# Patient Record
Sex: FEMALE | Race: ASIAN | NOT HISPANIC OR LATINO
[De-identification: names, ages, dates, MRNs, and addresses within clinical notes are randomized per-mention and may not be internally consistent; named-entity substitution may affect disease eponyms.]

---

## 2017-01-13 ENCOUNTER — APPOINTMENT (OUTPATIENT)
Dept: INTERNAL MEDICINE | Facility: CLINIC | Age: 57
End: 2017-01-13

## 2017-01-13 VITALS
HEIGHT: 61.5 IN | DIASTOLIC BLOOD PRESSURE: 72 MMHG | BODY MASS INDEX: 34.67 KG/M2 | HEART RATE: 79 BPM | OXYGEN SATURATION: 98 % | WEIGHT: 186 LBS | RESPIRATION RATE: 14 BRPM | SYSTOLIC BLOOD PRESSURE: 138 MMHG | TEMPERATURE: 98.2 F

## 2017-01-13 DIAGNOSIS — F41.9 ANXIETY DISORDER, UNSPECIFIED: ICD-10-CM

## 2017-01-13 DIAGNOSIS — Z86.39 PERSONAL HISTORY OF OTHER ENDOCRINE, NUTRITIONAL AND METABOLIC DISEASE: ICD-10-CM

## 2017-01-13 DIAGNOSIS — Z82.62 FAMILY HISTORY OF OSTEOPOROSIS: ICD-10-CM

## 2017-01-13 DIAGNOSIS — Z83.49 FAMILY HISTORY OF OTHER ENDOCRINE, NUTRITIONAL AND METABOLIC DISEASES: ICD-10-CM

## 2017-01-20 LAB
25(OH)D3 SERPL-MCNC: 39.8 NG/ML
ALBUMIN MFR SERPL ELPH: 62 %
ALBUMIN SERPL ELPH-MCNC: 4.3 G/DL
ALBUMIN SERPL-MCNC: 4 G/DL
ALBUMIN/GLOB SERPL: 1.6 RATIO
ALP BLD-CCNC: 88 U/L
ALPHA1 GLOB MFR SERPL ELPH: 4.1 %
ALPHA1 GLOB SERPL ELPH-MCNC: 0.3 G/DL
ALPHA2 GLOB MFR SERPL ELPH: 10.7 %
ALPHA2 GLOB SERPL ELPH-MCNC: 0.7 G/DL
ALT SERPL-CCNC: 36 U/L
ANION GAP SERPL CALC-SCNC: 15 MMOL/L
APPEARANCE: CLEAR
AST SERPL-CCNC: 18 U/L
B-GLOBULIN MFR SERPL ELPH: 10.4 %
B-GLOBULIN SERPL ELPH-MCNC: 0.7 G/DL
BACTERIA: NEGATIVE
BASOPHILS # BLD AUTO: 0.06 K/UL
BASOPHILS NFR BLD AUTO: 1.4 %
BILIRUB SERPL-MCNC: 1 MG/DL
BILIRUBIN URINE: NEGATIVE
BLOOD URINE: NEGATIVE
BUN SERPL-MCNC: 22 MG/DL
CALCIUM SERPL-MCNC: 9.7 MG/DL
CALCIUM SERPL-MCNC: 9.7 MG/DL
CHLORIDE SERPL-SCNC: 105 MMOL/L
CHOLEST SERPL-MCNC: 180 MG/DL
CHOLEST/HDLC SERPL: 3.5 RATIO
CO2 SERPL-SCNC: 24 MMOL/L
COLOR: YELLOW
CREAT SERPL-MCNC: 0.72 MG/DL
CRP SERPL-MCNC: <0.2 MG/DL
DEPRECATED KAPPA LC FREE/LAMBDA SER: 0.91 RATIO
EOSINOPHIL # BLD AUTO: 0.06 K/UL
EOSINOPHIL NFR BLD AUTO: 1.4 %
ERYTHROCYTE [SEDIMENTATION RATE] IN BLOOD BY WESTERGREN METHOD: 4 MM/HR
FOLATE SERPL-MCNC: 10 NG/ML
GAMMA GLOB FLD ELPH-MCNC: 0.8 G/DL
GAMMA GLOB MFR SERPL ELPH: 12.8 %
GLUCOSE QUALITATIVE U: NORMAL MG/DL
GLUCOSE SERPL-MCNC: 104 MG/DL
HCT VFR BLD CALC: 43.3 %
HDLC SERPL-MCNC: 51 MG/DL
HGB BLD-MCNC: 14.2 G/DL
IGA SER QL IEP: 101 MG/DL
IGG SER QL IEP: 834 MG/DL
IGM SER QL IEP: 84 MG/DL
IMM GRANULOCYTES NFR BLD AUTO: 0.2 %
INTERPRETATION SERPL IEP-IMP: NORMAL
KAPPA LC CSF-MCNC: 1.76 MG/DL
KAPPA LC SERPL-MCNC: 1.61 MG/DL
KETONES URINE: NEGATIVE
LDLC SERPL CALC-MCNC: 104 MG/DL
LEUKOCYTE ESTERASE URINE: NEGATIVE
LYMPHOCYTES # BLD AUTO: 1.86 K/UL
LYMPHOCYTES NFR BLD AUTO: 41.9 %
MAN DIFF?: NORMAL
MCHC RBC-ENTMCNC: 29.1 PG
MCHC RBC-ENTMCNC: 32.8 GM/DL
MCV RBC AUTO: 88.7 FL
MICROSCOPIC-UA: NORMAL
MONOCYTES # BLD AUTO: 0.3 K/UL
MONOCYTES NFR BLD AUTO: 6.8 %
NEUTROPHILS # BLD AUTO: 2.15 K/UL
NEUTROPHILS NFR BLD AUTO: 48.3 %
NITRITE URINE: NEGATIVE
PARATHYROID HORMONE INTACT: 44 PG/ML
PH URINE: 7
PHOSPHATE SERPL-MCNC: 3.3 MG/DL
PLATELET # BLD AUTO: 211 K/UL
POTASSIUM SERPL-SCNC: 4.1 MMOL/L
PROT SERPL-MCNC: 6.5 G/DL
PROTEIN URINE: NEGATIVE MG/DL
RBC # BLD: 4.88 M/UL
RBC # FLD: 12.6 %
RED BLOOD CELLS URINE: 0 /HPF
SODIUM SERPL-SCNC: 144 MMOL/L
SPECIFIC GRAVITY URINE: 1.02
SQUAMOUS EPITHELIAL CELLS: 2 /HPF
TRIGL SERPL-MCNC: 126 MG/DL
TSH SERPL-ACNC: 3.17 UIU/ML
UROBILINOGEN URINE: NORMAL MG/DL
VIT B12 SERPL-MCNC: 1219 PG/ML
WBC # FLD AUTO: 4.44 K/UL
WHITE BLOOD CELLS URINE: 2 /HPF

## 2017-01-23 LAB
COLLAGEN NTX UR-SCNC: 41 NM BCE/MM CR
CREAT UR-MCNC: 94.4 MG/DL
HBA1C MFR BLD HPLC: 5.7 %

## 2017-01-31 LAB — HEMOCCULT STL QL IA: NEGATIVE

## 2017-02-13 ENCOUNTER — MEDICATION RENEWAL (OUTPATIENT)
Age: 57
End: 2017-02-13

## 2017-02-16 ENCOUNTER — MEDICATION RENEWAL (OUTPATIENT)
Age: 57
End: 2017-02-16

## 2017-02-17 ENCOUNTER — MEDICATION RENEWAL (OUTPATIENT)
Age: 57
End: 2017-02-17

## 2017-04-18 ENCOUNTER — APPOINTMENT (OUTPATIENT)
Dept: INTERNAL MEDICINE | Facility: CLINIC | Age: 57
End: 2017-04-18

## 2017-07-05 ENCOUNTER — RESULT REVIEW (OUTPATIENT)
Age: 57
End: 2017-07-05

## 2017-07-07 ENCOUNTER — APPOINTMENT (OUTPATIENT)
Dept: MAMMOGRAPHY | Facility: CLINIC | Age: 57
End: 2017-07-07

## 2017-07-07 ENCOUNTER — OUTPATIENT (OUTPATIENT)
Dept: OUTPATIENT SERVICES | Facility: HOSPITAL | Age: 57
LOS: 1 days | End: 2017-07-07
Payer: COMMERCIAL

## 2017-07-07 ENCOUNTER — APPOINTMENT (OUTPATIENT)
Dept: ULTRASOUND IMAGING | Facility: CLINIC | Age: 57
End: 2017-07-07

## 2017-07-07 DIAGNOSIS — Z90.49 ACQUIRED ABSENCE OF OTHER SPECIFIED PARTS OF DIGESTIVE TRACT: Chronic | ICD-10-CM

## 2017-07-07 DIAGNOSIS — R92.8 OTHER ABNORMAL AND INCONCLUSIVE FINDINGS ON DIAGNOSTIC IMAGING OF BREAST: ICD-10-CM

## 2017-07-07 PROCEDURE — G0279: CPT

## 2017-07-07 PROCEDURE — 77065 DX MAMMO INCL CAD UNI: CPT

## 2017-07-17 ENCOUNTER — APPOINTMENT (OUTPATIENT)
Dept: CARDIOLOGY | Facility: CLINIC | Age: 57
End: 2017-07-17

## 2017-10-03 ENCOUNTER — APPOINTMENT (OUTPATIENT)
Dept: MRI IMAGING | Facility: CLINIC | Age: 57
End: 2017-10-03

## 2017-10-09 ENCOUNTER — OUTPATIENT (OUTPATIENT)
Dept: OUTPATIENT SERVICES | Facility: HOSPITAL | Age: 57
LOS: 1 days | End: 2017-10-09
Payer: COMMERCIAL

## 2017-10-09 ENCOUNTER — APPOINTMENT (OUTPATIENT)
Dept: MRI IMAGING | Facility: CLINIC | Age: 57
End: 2017-10-09
Payer: COMMERCIAL

## 2017-10-09 ENCOUNTER — APPOINTMENT (OUTPATIENT)
Dept: RADIOLOGY | Facility: CLINIC | Age: 57
End: 2017-10-09
Payer: COMMERCIAL

## 2017-10-09 DIAGNOSIS — Z00.8 ENCOUNTER FOR OTHER GENERAL EXAMINATION: ICD-10-CM

## 2017-10-09 DIAGNOSIS — Z90.49 ACQUIRED ABSENCE OF OTHER SPECIFIED PARTS OF DIGESTIVE TRACT: Chronic | ICD-10-CM

## 2017-10-09 PROCEDURE — 73560 X-RAY EXAM OF KNEE 1 OR 2: CPT

## 2017-10-09 PROCEDURE — 72148 MRI LUMBAR SPINE W/O DYE: CPT | Mod: 26

## 2017-10-09 PROCEDURE — 72148 MRI LUMBAR SPINE W/O DYE: CPT

## 2017-10-09 PROCEDURE — 73560 X-RAY EXAM OF KNEE 1 OR 2: CPT | Mod: 26,50

## 2017-10-16 DIAGNOSIS — M25.562 PAIN IN LEFT KNEE: ICD-10-CM

## 2017-10-16 DIAGNOSIS — M25.561 PAIN IN RIGHT KNEE: ICD-10-CM

## 2017-10-16 DIAGNOSIS — M48.061 SPINAL STENOSIS, LUMBAR REGION WITHOUT NEUROGENIC CLAUDICATION: ICD-10-CM

## 2017-10-16 DIAGNOSIS — M43.16 SPONDYLOLISTHESIS, LUMBAR REGION: ICD-10-CM

## 2017-10-16 DIAGNOSIS — M51.26 OTHER INTERVERTEBRAL DISC DISPLACEMENT, LUMBAR REGION: ICD-10-CM

## 2017-11-01 ENCOUNTER — APPOINTMENT (OUTPATIENT)
Dept: ORTHOPEDIC SURGERY | Facility: CLINIC | Age: 57
End: 2017-11-01

## 2017-11-27 ENCOUNTER — OUTPATIENT (OUTPATIENT)
Dept: OUTPATIENT SERVICES | Facility: HOSPITAL | Age: 57
LOS: 1 days | End: 2017-11-27
Payer: COMMERCIAL

## 2017-11-27 ENCOUNTER — APPOINTMENT (OUTPATIENT)
Dept: RADIOLOGY | Facility: CLINIC | Age: 57
End: 2017-11-27
Payer: COMMERCIAL

## 2017-11-27 DIAGNOSIS — Z13.820 ENCOUNTER FOR SCREENING FOR OSTEOPOROSIS: ICD-10-CM

## 2017-11-27 DIAGNOSIS — Z90.49 ACQUIRED ABSENCE OF OTHER SPECIFIED PARTS OF DIGESTIVE TRACT: Chronic | ICD-10-CM

## 2017-11-27 PROCEDURE — 77080 DXA BONE DENSITY AXIAL: CPT

## 2017-11-27 PROCEDURE — 77080 DXA BONE DENSITY AXIAL: CPT | Mod: 26

## 2018-01-10 ENCOUNTER — APPOINTMENT (OUTPATIENT)
Dept: ORTHOPEDIC SURGERY | Facility: CLINIC | Age: 58
End: 2018-01-10
Payer: COMMERCIAL

## 2018-01-10 VITALS
DIASTOLIC BLOOD PRESSURE: 79 MMHG | WEIGHT: 185 LBS | HEIGHT: 61.5 IN | SYSTOLIC BLOOD PRESSURE: 127 MMHG | HEART RATE: 70 BPM | BODY MASS INDEX: 34.48 KG/M2

## 2018-01-10 PROCEDURE — 72100 X-RAY EXAM L-S SPINE 2/3 VWS: CPT

## 2018-01-10 PROCEDURE — 99214 OFFICE O/P EST MOD 30 MIN: CPT

## 2018-02-27 ENCOUNTER — APPOINTMENT (OUTPATIENT)
Dept: INTERNAL MEDICINE | Facility: CLINIC | Age: 58
End: 2018-02-27

## 2018-09-18 ENCOUNTER — MESSAGE (OUTPATIENT)
Age: 58
End: 2018-09-18

## 2019-03-04 ENCOUNTER — APPOINTMENT (OUTPATIENT)
Dept: ORTHOPEDIC SURGERY | Facility: CLINIC | Age: 59
End: 2019-03-04
Payer: COMMERCIAL

## 2019-03-04 PROCEDURE — 99203 OFFICE O/P NEW LOW 30 MIN: CPT

## 2019-04-30 ENCOUNTER — MESSAGE (OUTPATIENT)
Age: 59
End: 2019-04-30

## 2019-05-16 ENCOUNTER — MESSAGE (OUTPATIENT)
Age: 59
End: 2019-05-16

## 2019-05-30 ENCOUNTER — MESSAGE (OUTPATIENT)
Age: 59
End: 2019-05-30

## 2019-10-16 ENCOUNTER — APPOINTMENT (OUTPATIENT)
Dept: ORTHOPEDIC SURGERY | Facility: CLINIC | Age: 59
End: 2019-10-16
Payer: COMMERCIAL

## 2019-10-16 VITALS
BODY MASS INDEX: 32.62 KG/M2 | SYSTOLIC BLOOD PRESSURE: 133 MMHG | HEART RATE: 79 BPM | HEIGHT: 61.5 IN | DIASTOLIC BLOOD PRESSURE: 82 MMHG | WEIGHT: 175 LBS

## 2019-10-16 DIAGNOSIS — M48.07 SPINAL STENOSIS, LUMBOSACRAL REGION: ICD-10-CM

## 2019-10-16 DIAGNOSIS — M43.16 SPONDYLOLISTHESIS, LUMBAR REGION: ICD-10-CM

## 2019-10-16 DIAGNOSIS — M54.16 RADICULOPATHY, LUMBAR REGION: ICD-10-CM

## 2019-10-16 PROCEDURE — 99214 OFFICE O/P EST MOD 30 MIN: CPT

## 2019-10-16 NOTE — HISTORY OF PRESENT ILLNESS
[All Other ROS Normal] : All other review of systems are negative except as noted [All Hx] : past medical, family, and social [All] : medication and allergy [FreeTextEntry1] : Chronic LBP\par L1 kyphoplasty [FreeTextEntry2] : Ms. LINNETTE PROCTOR  is a 57 year old female who presents to the office for a follow-up visit with a chronic history of back pain.  She also has pain down both legs in the lateral thigh and calf.  She is taking percocet and celebrex for pain control.  She wants to discuss surgery. \par

## 2019-10-16 NOTE — DISCUSSION/SUMMARY
[de-identified] : We discussed further treatment options. We again discussed the surgery in the form of a laminectomy and fusion. She will consider this option. In the interim we will obtain updated lumbar spine MRI

## 2019-10-16 NOTE — PHYSICAL EXAM
[Antalgic] : antalgic [Poor Appearance] : well-appearing [Acute Distress] : not in acute distress [Obese] : not obese [Abl Mood] : in a normal mood [Abl Affect] : with normal affect [Poor Coordination] : normal coordination [Disorientation] : oriented x 3 [FreeTextEntry2] : Examination of the lumbar spine reveals no midline tenderness palpation, step-offs, or skin lesions. Decreased range of motion with respect to flexion, extension, lateral bending, and rotation. No tenderness to palpation of the sciatic notch. No tenderness palpation of the bilateral greater trochanters. No pain with passive internal/external rotation of the hips. No instability of bilateral lower extremities.  Negative GINI. Negative straight leg raise bilaterally. No bowstring. Negative femoral stretch. 5 out of 5 iliopsoas, hip abductors, hips adductors, quadriceps, hamstrings, gastrocsoleus, tibialis anterior, extensor hallucis longus, peroneals. Grossly intact sensation to light touch bilateral lower extremities. 1+ patellar and Achilles reflexes. Downgoing Babinski. No clonus. Intact proprioception. Palpable pulses. No skin lesion and no edema on the right and left lower extremities. [de-identified] : Review of her recent lumbar spine MRI demonstrates evidence of prior L1 kyphoplasty. Multilevel disc degeneration with spondylosis. Significant facet arthropathy at L4-5 with moderate stenosis as well as grade 1 spondylolisthesis.\par \par AP and flexion-extension views the lumbar spine does appear to demonstrate some dynamic component to her spondylolisthesis.

## 2019-10-21 ENCOUNTER — APPOINTMENT (OUTPATIENT)
Dept: ORTHOPEDIC SURGERY | Facility: CLINIC | Age: 59
End: 2019-10-21

## 2019-11-28 ENCOUNTER — FORM ENCOUNTER (OUTPATIENT)
Age: 59
End: 2019-11-28

## 2019-11-29 ENCOUNTER — APPOINTMENT (OUTPATIENT)
Dept: RADIOLOGY | Facility: CLINIC | Age: 59
End: 2019-11-29
Payer: COMMERCIAL

## 2019-11-29 ENCOUNTER — OUTPATIENT (OUTPATIENT)
Dept: OUTPATIENT SERVICES | Facility: HOSPITAL | Age: 59
LOS: 1 days | End: 2019-11-29
Payer: COMMERCIAL

## 2019-11-29 ENCOUNTER — APPOINTMENT (OUTPATIENT)
Dept: MRI IMAGING | Facility: CLINIC | Age: 59
End: 2019-11-29
Payer: COMMERCIAL

## 2019-11-29 DIAGNOSIS — Z00.8 ENCOUNTER FOR OTHER GENERAL EXAMINATION: ICD-10-CM

## 2019-11-29 DIAGNOSIS — Z90.49 ACQUIRED ABSENCE OF OTHER SPECIFIED PARTS OF DIGESTIVE TRACT: Chronic | ICD-10-CM

## 2019-11-29 PROCEDURE — 77080 DXA BONE DENSITY AXIAL: CPT | Mod: 26

## 2019-11-29 PROCEDURE — 72148 MRI LUMBAR SPINE W/O DYE: CPT | Mod: 26

## 2019-11-29 PROCEDURE — 77080 DXA BONE DENSITY AXIAL: CPT

## 2019-11-29 PROCEDURE — 72148 MRI LUMBAR SPINE W/O DYE: CPT

## 2020-03-02 ENCOUNTER — APPOINTMENT (OUTPATIENT)
Dept: ORTHOPEDIC SURGERY | Facility: CLINIC | Age: 60
End: 2020-03-02

## 2020-04-06 ENCOUNTER — APPOINTMENT (OUTPATIENT)
Dept: ORTHOPEDIC SURGERY | Facility: CLINIC | Age: 60
End: 2020-04-06

## 2020-07-06 ENCOUNTER — APPOINTMENT (OUTPATIENT)
Dept: ORTHOPEDIC SURGERY | Facility: CLINIC | Age: 60
End: 2020-07-06
Payer: MEDICAID

## 2020-07-06 PROCEDURE — 99213 OFFICE O/P EST LOW 20 MIN: CPT | Mod: 95

## 2020-07-08 ENCOUNTER — OUTPATIENT (OUTPATIENT)
Dept: OUTPATIENT SERVICES | Facility: HOSPITAL | Age: 60
LOS: 1 days | End: 2020-07-08
Payer: MEDICAID

## 2020-07-08 ENCOUNTER — APPOINTMENT (OUTPATIENT)
Dept: MRI IMAGING | Facility: CLINIC | Age: 60
End: 2020-07-08
Payer: MEDICAID

## 2020-07-08 DIAGNOSIS — Z90.49 ACQUIRED ABSENCE OF OTHER SPECIFIED PARTS OF DIGESTIVE TRACT: Chronic | ICD-10-CM

## 2020-07-08 DIAGNOSIS — M75.101 UNSPECIFIED ROTATOR CUFF TEAR OR RUPTURE OF RIGHT SHOULDER, NOT SPECIFIED AS TRAUMATIC: ICD-10-CM

## 2020-07-08 PROCEDURE — 73221 MRI JOINT UPR EXTREM W/O DYE: CPT

## 2020-07-08 PROCEDURE — 73221 MRI JOINT UPR EXTREM W/O DYE: CPT | Mod: 26,RT

## 2020-08-10 ENCOUNTER — APPOINTMENT (OUTPATIENT)
Dept: ORTHOPEDIC SURGERY | Facility: CLINIC | Age: 60
End: 2020-08-10
Payer: MEDICAID

## 2020-08-10 DIAGNOSIS — M75.102 UNSPECIFIED ROTATOR CUFF TEAR OR RUPTURE OF LEFT SHOULDER, NOT SPECIFIED AS TRAUMATIC: ICD-10-CM

## 2020-08-10 PROCEDURE — 99213 OFFICE O/P EST LOW 20 MIN: CPT

## 2021-03-12 ENCOUNTER — APPOINTMENT (OUTPATIENT)
Dept: ORTHOPEDIC SURGERY | Facility: CLINIC | Age: 61
End: 2021-03-12
Payer: MEDICAID

## 2021-03-12 VITALS
HEART RATE: 77 BPM | WEIGHT: 173 LBS | HEIGHT: 61.5 IN | BODY MASS INDEX: 32.24 KG/M2 | DIASTOLIC BLOOD PRESSURE: 84 MMHG | SYSTOLIC BLOOD PRESSURE: 132 MMHG

## 2021-03-12 PROCEDURE — 99214 OFFICE O/P EST MOD 30 MIN: CPT

## 2021-03-12 PROCEDURE — 99072 ADDL SUPL MATRL&STAF TM PHE: CPT

## 2021-03-23 NOTE — PHYSICAL EXAM
[de-identified] : Oriented to time, place, person\par Mood: Normal\par Affect: Normal\par Appearance: Healthy, well appearing, no acute distress.\par Gait: Normal\par Assistive Devices: None\par \par Right shoulder exam\par \par Inspection: No malalignment, No defects, No atrophy\par Skin: No masses, No lesions\par Neck: Negative Spurlings, full ROM, no pain with ROM\par AROM: FF to 120, abduction to 70, ER to 60, IR to low lumbar\par PROM: Full\par Painful arc ROM: Pain with further passive/active motion\par Tenderness: No bicipital tenderness, no tenderness to the greater tuberosity/RTC insertion, no anterior shoulder/lesser tuberosity tenderness\par Strength: 3+/5 ER, 5/5 IR in adduction, 4/5 supraspinatus testing, positive O'Briens test\par AC Joint: No ttp/pain with cross arm testing\par Biceps: Speed Negative, Yergusons Negative\par Impingement test: Positive Ballesteros, Positive Neer \par Stability: Stable\par Vasc: 2+ radial pulse\par Neuro: AIN, PIN, Ulnar nerve in tact to motor\par Sensation: In tact to light touch throughout\par  [de-identified] : X-ray imaging not available for review.  \par \par MRI right shoulder dated 7/8/2020 shows evidence of severe tendinosis supraspinatus tendon with full-thickness tear.  Associated infraspinatus/subscapularis tendinopathy.  Fatty infiltration supraspinatus tendon.  Tendinosis long head biceps tendon with diffuse degeneration labrum.  AC joint arthrosis.

## 2021-03-23 NOTE — DISCUSSION/SUMMARY
[de-identified] : 60-year-old female with right rotator cuff tear\par \par Patient presents for evaluation of right rotator cuff symptoms.  Patient has weakness and pain with forward elevation and rotation of the right shoulder.  Patient is symptomatic to the right shoulder consistent with rotator cuff injury seen on MRI from 2020.  There is high-grade tendinopathy throughout the supra and infraspinatus tendons with high-grade full-thickness tearing through the anterior shoulder.  There is associated atrophy as well as degeneration to the long head biceps tendon/labral complex and AC joint.  Discussion was had with the patient regarding surgical intervention that would include likely rotator cuff repair with possible tenodesis of the biceps and AC joint resection.  Rotator cuff injury likely attritional failure as opposed to acute traumatic injury.  We also discussed her chronic narcotic use as well as myalgias that place her at high risk of prolonged recovery/pain management.\par \par All risks, benefits and alternatives to the proposed surgical procedure, right shoulder arthroscopy and rotator cuff repair, as well as the need for formal post-operative rehabilitation were discussed in great detail with the patient. Risks include but are not limited to pain, bleeding, infection, neurovascular injury, stiffness, medical complications (including DVT, PE, MI), and risks of anesthesia. \par \par A discussion was also had with the patient regarding additional risk given recent Covid-19 pandemic; including the potential additional risk of anesthesia and any medical comorbidities that the patient has.  It was made clear to the patient that we are taking all precautions regarding Covid-19 with regards to surgical scheduling and perioperative care.  The patient understands that current protocol requires Covid-19 testing no more than 48hrs prior to surgery, and PST's may be performed onsite at the day of surgery. \par \par The patient expressed understanding and all questions were answered. The patient is electing to proceed, and will have the patient scheduled accordingly.

## 2021-03-23 NOTE — HISTORY OF PRESENT ILLNESS
[de-identified] : 59 yo female presents today for a second opinion regarding right rotator cuff surgery. She was seen by Dr Gill in August 2020 and after failure of conservative management was indicated for shoulder surgery for a RTC tear. At the time she was unable to undergo surgery due to family/social issues. She presents today with continued complaints of severe right shoulder pain. She has difficulty with ADLs. She drops everyday items has trouble sleeping.  She has MRI from July 2020 for review.  Symptoms have been persistent for many years.  Patient has history of fibromyalgia as well as chronic narcotic use.

## 2021-04-03 DIAGNOSIS — Z01.818 ENCOUNTER FOR OTHER PREPROCEDURAL EXAMINATION: ICD-10-CM

## 2021-04-04 ENCOUNTER — APPOINTMENT (OUTPATIENT)
Dept: DISASTER EMERGENCY | Facility: CLINIC | Age: 61
End: 2021-04-04

## 2021-04-21 ENCOUNTER — OUTPATIENT (OUTPATIENT)
Dept: OUTPATIENT SERVICES | Facility: HOSPITAL | Age: 61
LOS: 1 days | End: 2021-04-21
Payer: MEDICAID

## 2021-04-21 VITALS
HEART RATE: 74 BPM | WEIGHT: 175.05 LBS | HEIGHT: 61 IN | OXYGEN SATURATION: 99 % | TEMPERATURE: 98 F | RESPIRATION RATE: 16 BRPM | SYSTOLIC BLOOD PRESSURE: 114 MMHG | DIASTOLIC BLOOD PRESSURE: 80 MMHG

## 2021-04-21 DIAGNOSIS — E05.90 THYROTOXICOSIS, UNSPECIFIED WITHOUT THYROTOXIC CRISIS OR STORM: ICD-10-CM

## 2021-04-21 DIAGNOSIS — M75.41 IMPINGEMENT SYNDROME OF RIGHT SHOULDER: ICD-10-CM

## 2021-04-21 DIAGNOSIS — Z90.49 ACQUIRED ABSENCE OF OTHER SPECIFIED PARTS OF DIGESTIVE TRACT: Chronic | ICD-10-CM

## 2021-04-21 DIAGNOSIS — M75.101 UNSPECIFIED ROTATOR CUFF TEAR OR RUPTURE OF RIGHT SHOULDER, NOT SPECIFIED AS TRAUMATIC: ICD-10-CM

## 2021-04-21 LAB
ANION GAP SERPL CALC-SCNC: 10 MMOL/L — SIGNIFICANT CHANGE UP (ref 7–14)
ANION GAP SERPL CALC-SCNC: 11 MMOL/L — SIGNIFICANT CHANGE UP (ref 7–14)
BUN SERPL-MCNC: 12 MG/DL — SIGNIFICANT CHANGE UP (ref 7–23)
BUN SERPL-MCNC: 12 MG/DL — SIGNIFICANT CHANGE UP (ref 7–23)
CALCIUM SERPL-MCNC: 9.5 MG/DL — SIGNIFICANT CHANGE UP (ref 8.4–10.5)
CALCIUM SERPL-MCNC: 9.5 MG/DL — SIGNIFICANT CHANGE UP (ref 8.4–10.5)
CHLORIDE SERPL-SCNC: 103 MMOL/L — SIGNIFICANT CHANGE UP (ref 98–107)
CHLORIDE SERPL-SCNC: 103 MMOL/L — SIGNIFICANT CHANGE UP (ref 98–107)
CO2 SERPL-SCNC: 29 MMOL/L — SIGNIFICANT CHANGE UP (ref 22–31)
CO2 SERPL-SCNC: 29 MMOL/L — SIGNIFICANT CHANGE UP (ref 22–31)
CREAT SERPL-MCNC: 0.56 MG/DL — SIGNIFICANT CHANGE UP (ref 0.5–1.3)
CREAT SERPL-MCNC: 0.59 MG/DL — SIGNIFICANT CHANGE UP (ref 0.5–1.3)
GLUCOSE SERPL-MCNC: 118 MG/DL — HIGH (ref 70–99)
GLUCOSE SERPL-MCNC: 124 MG/DL — HIGH (ref 70–99)
HCT VFR BLD CALC: 43.4 % — SIGNIFICANT CHANGE UP (ref 34.5–45)
HGB BLD-MCNC: 14.4 G/DL — SIGNIFICANT CHANGE UP (ref 11.5–15.5)
MCHC RBC-ENTMCNC: 28.8 PG — SIGNIFICANT CHANGE UP (ref 27–34)
MCHC RBC-ENTMCNC: 33.2 GM/DL — SIGNIFICANT CHANGE UP (ref 32–36)
MCV RBC AUTO: 86.8 FL — SIGNIFICANT CHANGE UP (ref 80–100)
NRBC # BLD: 0 /100 WBCS — SIGNIFICANT CHANGE UP
NRBC # FLD: 0 K/UL — SIGNIFICANT CHANGE UP
PLATELET # BLD AUTO: 247 K/UL — SIGNIFICANT CHANGE UP (ref 150–400)
POTASSIUM SERPL-MCNC: 3.7 MMOL/L — SIGNIFICANT CHANGE UP (ref 3.5–5.3)
POTASSIUM SERPL-MCNC: 3.9 MMOL/L — SIGNIFICANT CHANGE UP (ref 3.5–5.3)
POTASSIUM SERPL-SCNC: 3.7 MMOL/L — SIGNIFICANT CHANGE UP (ref 3.5–5.3)
POTASSIUM SERPL-SCNC: 3.9 MMOL/L — SIGNIFICANT CHANGE UP (ref 3.5–5.3)
RBC # BLD: 5 M/UL — SIGNIFICANT CHANGE UP (ref 3.8–5.2)
RBC # FLD: 12.4 % — SIGNIFICANT CHANGE UP (ref 10.3–14.5)
SODIUM SERPL-SCNC: 142 MMOL/L — SIGNIFICANT CHANGE UP (ref 135–145)
SODIUM SERPL-SCNC: 143 MMOL/L — SIGNIFICANT CHANGE UP (ref 135–145)
T3 SERPL-MCNC: 98 NG/DL — SIGNIFICANT CHANGE UP (ref 80–200)
T4 AB SER-ACNC: 5.79 UG/DL — SIGNIFICANT CHANGE UP (ref 5.1–13)
T4 FREE SERPL-MCNC: 0.9 NG/DL — SIGNIFICANT CHANGE UP (ref 0.9–1.8)
TSH SERPL-MCNC: 2.15 UIU/ML — SIGNIFICANT CHANGE UP (ref 0.27–4.2)
WBC # BLD: 6.08 K/UL — SIGNIFICANT CHANGE UP (ref 3.8–10.5)
WBC # FLD AUTO: 6.08 K/UL — SIGNIFICANT CHANGE UP (ref 3.8–10.5)

## 2021-04-21 PROCEDURE — 93010 ELECTROCARDIOGRAM REPORT: CPT

## 2021-04-21 NOTE — H&P PST ADULT - RESPIRATORY AND THORAX COMMENTS
Hx of asthma, uses inhaler with good symptom control. Never hospitalized per pt Hx of asthma, uses inhaler with good symptom control. Never hospitalized for asthma, per pt

## 2021-04-21 NOTE — H&P PST ADULT - NSICDXPASTMEDICALHX_GEN_ALL_CORE_FT
PAST MEDICAL HISTORY:  GERD (gastroesophageal reflux disease)     HLD (hyperlipidemia)     HTN (hypertension)     Hyperthyroidism      PAST MEDICAL HISTORY:  Fibromyalgia     GERD (gastroesophageal reflux disease)     HLD (hyperlipidemia)     HTN (hypertension)     Hyperthyroidism on Methimazole    Obesity

## 2021-04-21 NOTE — H&P PST ADULT - MUSCULOSKELETAL COMMENTS
hx of right shoulder pain x3 years, worsening.  Pt reports Dx with rotator cuff tear.  Scheduled for right shoulder major debridement, subacromial  decompression,  acromioplasty, arthroscopic rotator cuff repair right shoulder pain with movement

## 2021-04-21 NOTE — H&P PST ADULT - ASSESSMENT
60 y/o female with hx of right shoulder pain x3 years, worsening.  Pt reports Dx with rotator cuff tear.  Scheduled for right shoulder major debridement, subacromial  decompression,  acromioplasty, arthroscopic rotator cuff repair

## 2021-04-21 NOTE — H&P PST ADULT - HISTORY OF PRESENT ILLNESS
62 y/o female with hx of right shoulder pain x3 years, worsening.  Pt reports Dx with rotator cuff tear.  Scheduled for right shoulder major debridement, subacromial  decompression,  acromioplasty, arthroscopic rotator cuff repair

## 2021-04-21 NOTE — H&P PST ADULT - NSANTHOSAYNRD_GEN_A_CORE
No. NATHALY screening performed.  STOP BANG Legend: 0-2 = LOW Risk; 3-4 = INTERMEDIATE Risk; 5-8 = HIGH Risk

## 2021-04-21 NOTE — H&P PST ADULT - NSICDXPROBLEM_GEN_ALL_CORE_FT
PROBLEM DIAGNOSES  Problem: Right rotator cuff tear  Assessment and Plan:  right shoulder major debridement, subacromial  decompression,  acromioplasty, arthroscopic rotator cuff repair  CBC BMP TSH Free T4 T3 T4   EKG  Pt advised to obrtain PMD pre-op clearnce  Preop instructions and antibacterial soap given and explained (verbal and written), with teach back.     Problem: Hyperthyroidism  Assessment and Plan: Most recent Ednocrinology note requested on chart       PROBLEM DIAGNOSES  Problem: Right rotator cuff tear  Assessment and Plan:  right shoulder major debridement, subacromial  decompression,  acromioplasty, arthroscopic rotator cuff repair  CBC BMP TSH Free T4 T3 T4   EKG  Pt advised to obrtain PMD pre-op clearnace  Preop instructions and antibacterial soap given and explained (verbal and written), with teach back.     Problem: Hyperthyroidism  Assessment and Plan: Most recent Ednocrinology note requested on chart

## 2021-04-24 ENCOUNTER — APPOINTMENT (OUTPATIENT)
Dept: DISASTER EMERGENCY | Facility: CLINIC | Age: 61
End: 2021-04-24

## 2021-04-25 LAB — SARS-COV-2 N GENE NPH QL NAA+PROBE: NOT DETECTED

## 2021-04-26 ENCOUNTER — TRANSCRIPTION ENCOUNTER (OUTPATIENT)
Age: 61
End: 2021-04-26

## 2021-04-26 VITALS
WEIGHT: 175.05 LBS | OXYGEN SATURATION: 99 % | HEIGHT: 61 IN | HEART RATE: 76 BPM | DIASTOLIC BLOOD PRESSURE: 78 MMHG | SYSTOLIC BLOOD PRESSURE: 118 MMHG | TEMPERATURE: 98 F | RESPIRATION RATE: 16 BRPM

## 2021-04-27 ENCOUNTER — APPOINTMENT (OUTPATIENT)
Dept: ORTHOPEDIC SURGERY | Facility: AMBULATORY SURGERY CENTER | Age: 61
End: 2021-04-27

## 2021-04-27 ENCOUNTER — OUTPATIENT (OUTPATIENT)
Dept: OUTPATIENT SERVICES | Facility: HOSPITAL | Age: 61
LOS: 1 days | Discharge: HOME | End: 2021-04-27
Payer: MEDICAID

## 2021-04-27 VITALS
RESPIRATION RATE: 15 BRPM | OXYGEN SATURATION: 100 % | DIASTOLIC BLOOD PRESSURE: 74 MMHG | HEART RATE: 76 BPM | SYSTOLIC BLOOD PRESSURE: 121 MMHG | TEMPERATURE: 98 F

## 2021-04-27 DIAGNOSIS — Z90.49 ACQUIRED ABSENCE OF OTHER SPECIFIED PARTS OF DIGESTIVE TRACT: Chronic | ICD-10-CM

## 2021-04-27 DIAGNOSIS — M75.41 IMPINGEMENT SYNDROME OF RIGHT SHOULDER: ICD-10-CM

## 2021-04-27 PROCEDURE — 29823 SHO ARTHRS SRG XTNSV DBRDMT: CPT | Mod: RT

## 2021-04-27 PROCEDURE — 29826 SHO ARTHRS SRG DECOMPRESSION: CPT | Mod: RT

## 2021-04-27 PROCEDURE — 29827 SHO ARTHRS SRG RT8TR CUF RPR: CPT | Mod: RT

## 2021-04-27 NOTE — ASU DISCHARGE PLAN (ADULT/PEDIATRIC) - NURSING INSTRUCTIONS
No fried, spicy or greasy foods. Increase fluids as tolerated  If your doctor prescribed narcotic pain medications after your procedure to help prevent and reduce constipation, increase fluid intake and fiber intake in your diet. You may take OTC Stool Softeners such as Colace to help reduce constipation.    NO ANTI-INFLAMMATORIES UNTIL AFTER 7PM    DO NOT TAKE ANYTHING CONTAINING TYLENOL UNTIL AFTER 630PM

## 2021-05-01 ENCOUNTER — NON-APPOINTMENT (OUTPATIENT)
Age: 61
End: 2021-05-01

## 2021-05-10 ENCOUNTER — APPOINTMENT (OUTPATIENT)
Dept: ORTHOPEDIC SURGERY | Facility: CLINIC | Age: 61
End: 2021-05-10
Payer: MEDICAID

## 2021-05-10 PROBLEM — M79.7 FIBROMYALGIA: Chronic | Status: ACTIVE | Noted: 2021-04-21

## 2021-05-10 PROBLEM — E66.9 OBESITY, UNSPECIFIED: Chronic | Status: ACTIVE | Noted: 2021-04-21

## 2021-05-10 PROCEDURE — 99024 POSTOP FOLLOW-UP VISIT: CPT

## 2021-05-17 RX ORDER — OXYCODONE AND ACETAMINOPHEN 5; 325 MG/1; MG/1
5-325 TABLET ORAL
Qty: 10 | Refills: 0 | Status: ACTIVE | COMMUNITY
Start: 2021-05-17 | End: 1900-01-01

## 2021-05-28 NOTE — ADDENDUM
[FreeTextEntry1] : This note was written by Luzmaria Valdez on 05/10/2021 acting solely as a scribe for Dr. Kunal Palmer.\par \par All medical record entries made by the Scribe were at my, Dr. Kunal Palmer, direction and personally dictated by me on 05/10/2021. I have personally reviewed the chart and agree that the record accurately reflects my personal performance of the history, physical exam, assessment and plan.

## 2021-05-28 NOTE — HISTORY OF PRESENT ILLNESS
[8] : the patient reports pain that is 8/10 in severity [Clean/Dry/Intact] : clean, dry and intact [Healed] : healed [Neuro Intact] : an unremarkable neurological exam [Vascular Intact] : ~T peripheral vascular exam normal [Doing Well] : is doing well [Excellent Pain Control] : has excellent pain control [No Sign of Infection] : is showing no signs of infection [Sutures Removed] : sutures were removed [Steri-Strips Removed & Replaced] : steri-strips removed and replaced [Chills] : no chills [Fever] : no fever [Nausea] : no nausea [Vomiting] : no vomiting [Erythema] : not erythematous [Discharge] : absent of discharge [Swelling] : not swollen [Dehiscence] : not dehisced [de-identified] : 62 y/o female s/p right shoulder SAD, RTCR, biceps tenotomy 4/27/2021 [de-identified] : 62 y/o female s/p right shoulder SAD, RTCR, biceps tenotomy. She is doing well. She presents in brace. She is taking Percocet. Denies post op complications.  [de-identified] : Right shoulder exam:\par \par Inspection: No significant ecchymosis, no residual swelling\par Skin: Incisions C/D/I, no drainage, healed\par ROM: not tested \par Painful arc ROM: not tested\par Tenderness: Tenderness throughout the shoulder girdle\par Strength: Unable to test\par Vasc: 2+ radial pulse \par Neuro: AIN, PIN, Ulnar nerve intact to motor\par Sensation: Intact to light touch throughout  [de-identified] : 62 y/o female s/p right shoulder SAD, RTCR, biceps tenotomy\par \par All intraoperative imaging was discussed in detail with the patient. All questions were answered regardingsurgical procedure as well as immediate post-operative recovery period. We also discussed the post-operative rehabilitation program in great detail.\par \par Recommendations:\par 1. PT evaluation and treatment as per protocol provided (Rx given). HEP as instructed.\par 2. Brace: Discussed use of abduction brace, removal for hygeine and HEP.\par 3. Meds: Wean pain medication, may transition to Tylenol/NSAID's as tolerated.\par 4. Ice/cryocuff as needed\par 5. Restrictions: As discussed \par \par Followup 4 weeks for repeat clinical evaluation.

## 2021-06-10 ENCOUNTER — APPOINTMENT (OUTPATIENT)
Dept: ORTHOPEDIC SURGERY | Facility: CLINIC | Age: 61
End: 2021-06-10
Payer: MEDICAID

## 2021-06-10 DIAGNOSIS — M75.101 UNSPECIFIED ROTATOR CUFF TEAR OR RUPTURE OF RIGHT SHOULDER, NOT SPECIFIED AS TRAUMATIC: ICD-10-CM

## 2021-06-10 PROCEDURE — 99024 POSTOP FOLLOW-UP VISIT: CPT

## 2021-06-10 NOTE — ADDENDUM
[FreeTextEntry1] : This note was written by Luzmaria Valdez on 06/10/2021 acting solely as a scribe for Dr. Kunal Palmer.\par \par All medical record entries made by the Scribe were at my, Dr. Kunal Palmer, direction and personally dictated by me on 06/10/2021. I have personally reviewed the chart and agree that the record accurately reflects my personal performance of the history, physical exam, assessment and plan.

## 2021-06-10 NOTE — HISTORY OF PRESENT ILLNESS
[7] : the patient reports pain that is 7/10 in severity [Clean/Dry/Intact] : clean, dry and intact [Healed] : healed [Neuro Intact] : an unremarkable neurological exam [Vascular Intact] : ~T peripheral vascular exam normal [Excellent Pain Control] : has excellent pain control [No Sign of Infection] : is showing no signs of infection [Chills] : no chills [Fever] : no fever [Nausea] : no nausea [Vomiting] : no vomiting [Erythema] : not erythematous [Discharge] : absent of discharge [Swelling] : not swollen [Dehiscence] : not dehisced [Slow Progress] : is progressing slowly [de-identified] : 60 y/o female s/p right shoulder SAD, RTCR, biceps tenotomy 4/27/2021 [de-identified] : 62 y/o female s/p right shoulder SAD, RTCR, biceps tenotomy. She only had 5 PT session. She is doing well. She presents in brace. She is taking Percocet/Ibuprofen. Denies post op complications.  [de-identified] : Right shoulder exam:\par \par Inspection: No swelling, hesitancy to remove brace\par Skin: Incisions C/D/I, no drainage, healed\par ROM: 0-60 FF, 60 abd, minimal ER\par Painful arc ROM: pain with further motion\par Tenderness: Tenderness throughout the shoulder girdle\par Strength: Unable to test\par Vasc: 2+ radial pulse \par Neuro: AIN, PIN, Ulnar nerve intact to motor\par Sensation: Intact to light touch throughout  [de-identified] : 60 y/o female s/p right shoulder SAD, RTCR, biceps tenotomy\par \par Patient is doing well at this time following surgical intervention. Denies any immediate postoperative complications, but is having some apprehension with removal of brace. Progressing slowly with physical therapy. Discussion was had with the patient regarding the next phase of physical therapy as instructed. \par \par Recommendations:\par 1. Continue PT treatment as per protocol (Updated Rx given). HEP as instructed.\par 2. Brace: Discontinue abduction brace.\par 3. Meds: Tylenol/NSAID's as tolerated.\par 4. Ice as needed after activity/HEP\par 5. Restrictions: None\par \par Followup 6 weeks for repeat clinical evaluation.

## 2021-07-22 ENCOUNTER — RX RENEWAL (OUTPATIENT)
Age: 61
End: 2021-07-22

## 2021-07-26 ENCOUNTER — APPOINTMENT (OUTPATIENT)
Dept: ORTHOPEDIC SURGERY | Facility: CLINIC | Age: 61
End: 2021-07-26
Payer: MEDICAID

## 2021-07-26 VITALS — WEIGHT: 172 LBS | HEIGHT: 62 IN | BODY MASS INDEX: 31.65 KG/M2

## 2021-07-26 DIAGNOSIS — M67.911 UNSPECIFIED DISORDER OF SYNOVIUM AND TENDON, RIGHT SHOULDER: ICD-10-CM

## 2021-07-26 PROCEDURE — 99024 POSTOP FOLLOW-UP VISIT: CPT

## 2021-07-27 ENCOUNTER — APPOINTMENT (OUTPATIENT)
Dept: INTERNAL MEDICINE | Facility: CLINIC | Age: 61
End: 2021-07-27

## 2021-07-28 PROBLEM — M67.911 TENDINOPATHY OF RIGHT ROTATOR CUFF: Status: ACTIVE | Noted: 2019-03-04

## 2021-07-28 NOTE — ADDENDUM
[FreeTextEntry1] : This note was written by Luzmaria Valdez on 07/26/2021 acting solely as a scribe for Dr. Kunal Palmer.\par \par All medical record entries made by the Scribe were at my, Dr. Kunal Palmer, direction and personally dictated by me on 07/26/2021. I have personally reviewed the chart and agree that the record accurately reflects my personal performance of the history, physical exam, assessment and plan.

## 2021-07-28 NOTE — HISTORY OF PRESENT ILLNESS
[0] : no pain reported [Clean/Dry/Intact] : clean, dry and intact [Healed] : healed [Neuro Intact] : an unremarkable neurological exam [Vascular Intact] : ~T peripheral vascular exam normal [Slow Progress] : is progressing slowly [Excellent Pain Control] : has excellent pain control [No Sign of Infection] : is showing no signs of infection [Chills] : no chills [Fever] : no fever [Nausea] : no nausea [Vomiting] : no vomiting [Erythema] : not erythematous [Discharge] : absent of discharge [Swelling] : not swollen [Dehiscence] : not dehisced [de-identified] : 60 y/o female s/p right shoulder SAD, RTCR, biceps tenotomy 4/27/2021 [de-identified] : 62 y/o female s/p right shoulder SAD, RTCR, biceps tenotomy.Attending PT 2 x per week.  She is doing well. She is taking Ibuprofen. Denies post op complications.  [de-identified] : Right shoulder exam:\par \par Inspection: No swelling, hesitancy to remove brace\par Skin: Incisions C/D/I, no drainage, healed\par ROM: 0-60 FF, 60 abd, minimal ER\par Painful arc ROM: pain with further motion\par Tenderness: Tenderness throughout the shoulder girdle\par Strength: Unable to test\par Vasc: 2+ radial pulse \par Neuro: AIN, PIN, Ulnar nerve intact to motor\par Sensation: Intact to light touch throughout  [de-identified] : 60 y/o female s/p right shoulder SAD, RTCR, biceps tenotomy\par \par Patient has some hesitancy with removal of brace, but otherwise is doing well postoperatively.  Recommendations are for continued postoperative treatment as indicated in the postoperative protocol.\par \par Recommendations:\par 1. Continue PT treatment as per protocol (Updated Rx given). HEP for terminal ROM exercises / strengthening and conditioning.\par 2. Meds: Tylenol/NSAID's as tolerated.\par 3. Ice PRN\par 4. Return to ADL's, light activity as instructed.\par \par Followup 3 months.

## 2021-07-30 ENCOUNTER — APPOINTMENT (OUTPATIENT)
Dept: INTERNAL MEDICINE | Facility: CLINIC | Age: 61
End: 2021-07-30
Payer: MEDICAID

## 2021-07-30 ENCOUNTER — NON-APPOINTMENT (OUTPATIENT)
Age: 61
End: 2021-07-30

## 2021-07-30 VITALS
SYSTOLIC BLOOD PRESSURE: 132 MMHG | WEIGHT: 173 LBS | OXYGEN SATURATION: 98 % | HEART RATE: 76 BPM | RESPIRATION RATE: 14 BRPM | TEMPERATURE: 98.3 F | BODY MASS INDEX: 31.83 KG/M2 | HEIGHT: 62 IN | DIASTOLIC BLOOD PRESSURE: 74 MMHG

## 2021-07-30 PROCEDURE — 99386 PREV VISIT NEW AGE 40-64: CPT

## 2021-07-30 NOTE — ASSESSMENT
[FreeTextEntry1] : Hyperthyroidism- check labs- follow up with endo\par HTN- good control\par Hyperlipidemia- check lipid today\par Fibromyalgia- follow up with rheum\par Migraines- to see neuro\par HCM- needs mammogram

## 2021-07-30 NOTE — HISTORY OF PRESENT ILLNESS
[de-identified] : History of fibromyalgic- followed by rheum. \par History of overactive thyroids- followed bye endo\par History of HTN- stable on Meds\par History of hyperlipidemia- stable on Meds- followed by cardiology

## 2021-07-30 NOTE — HEALTH RISK ASSESSMENT
[Good] : ~his/her~  mood as  good [] : No [No] : No [No falls in past year] : Patient reported no falls in the past year [0] : 2) Feeling down, depressed, or hopeless: Not at all (0) [Patient reported mammogram was normal] : Patient reported mammogram was normal [Change in mental status noted] : No change in mental status noted [None] : None [With Significant Other] : lives with significant other [Fully functional (bathing, dressing, toileting, transferring, walking, feeding)] : Fully functional (bathing, dressing, toileting, transferring, walking, feeding) [Fully functional (using the telephone, shopping, preparing meals, housekeeping, doing laundry, using] : Fully functional and needs no help or supervision to perform IADLs (using the telephone, shopping, preparing meals, housekeeping, doing laundry, using transportation, managing medications and managing finances) [Reports changes in hearing] : Reports no changes in hearing [Reports changes in vision] : Reports no changes in vision [MammogramDate] : 07/21 [ColonoscopyDate] : 2012

## 2021-08-02 LAB
25(OH)D3 SERPL-MCNC: 35.4 NG/ML
ALBUMIN SERPL ELPH-MCNC: 4.8 G/DL
ALP BLD-CCNC: 92 U/L
ALT SERPL-CCNC: 26 U/L
ANION GAP SERPL CALC-SCNC: 15 MMOL/L
APPEARANCE: CLEAR
AST SERPL-CCNC: 23 U/L
BACTERIA: NEGATIVE
BASOPHILS # BLD AUTO: 0.05 K/UL
BASOPHILS NFR BLD AUTO: 0.9 %
BILIRUB SERPL-MCNC: 1 MG/DL
BILIRUBIN URINE: NEGATIVE
BLOOD URINE: NEGATIVE
BUN SERPL-MCNC: 12 MG/DL
CALCIUM SERPL-MCNC: 9.8 MG/DL
CHLORIDE SERPL-SCNC: 102 MMOL/L
CHOLEST SERPL-MCNC: 199 MG/DL
CO2 SERPL-SCNC: 25 MMOL/L
COLOR: NORMAL
CREAT SERPL-MCNC: 0.57 MG/DL
EOSINOPHIL # BLD AUTO: 0.1 K/UL
EOSINOPHIL NFR BLD AUTO: 1.8 %
ESTIMATED AVERAGE GLUCOSE: 117 MG/DL
FOLATE SERPL-MCNC: 18.9 NG/ML
GLUCOSE QUALITATIVE U: NEGATIVE
GLUCOSE SERPL-MCNC: 113 MG/DL
HBA1C MFR BLD HPLC: 5.7 %
HCT VFR BLD CALC: 45.7 %
HDLC SERPL-MCNC: 58 MG/DL
HGB BLD-MCNC: 14.9 G/DL
HYALINE CASTS: 0 /LPF
IMM GRANULOCYTES NFR BLD AUTO: 0.4 %
KETONES URINE: NEGATIVE
LDLC SERPL CALC-MCNC: 120 MG/DL
LEUKOCYTE ESTERASE URINE: NEGATIVE
LYMPHOCYTES # BLD AUTO: 2.06 K/UL
LYMPHOCYTES NFR BLD AUTO: 37.1 %
MAN DIFF?: NORMAL
MCHC RBC-ENTMCNC: 28.8 PG
MCHC RBC-ENTMCNC: 32.6 GM/DL
MCV RBC AUTO: 88.2 FL
MICROSCOPIC-UA: NORMAL
MONOCYTES # BLD AUTO: 0.4 K/UL
MONOCYTES NFR BLD AUTO: 7.2 %
NEUTROPHILS # BLD AUTO: 2.93 K/UL
NEUTROPHILS NFR BLD AUTO: 52.6 %
NITRITE URINE: NEGATIVE
NONHDLC SERPL-MCNC: 142 MG/DL
PH URINE: 7
PLATELET # BLD AUTO: 253 K/UL
POTASSIUM SERPL-SCNC: 4.3 MMOL/L
PROT SERPL-MCNC: 7.1 G/DL
PROTEIN URINE: NEGATIVE
RBC # BLD: 5.18 M/UL
RBC # FLD: 12.7 %
RED BLOOD CELLS URINE: 0 /HPF
SODIUM SERPL-SCNC: 142 MMOL/L
SPECIFIC GRAVITY URINE: 1.01
SQUAMOUS EPITHELIAL CELLS: 0 /HPF
TRIGL SERPL-MCNC: 109 MG/DL
TSH SERPL-ACNC: 1.89 UIU/ML
UROBILINOGEN URINE: NORMAL
VIT B12 SERPL-MCNC: 1075 PG/ML
WBC # FLD AUTO: 5.56 K/UL
WHITE BLOOD CELLS URINE: 1 /HPF

## 2021-08-18 ENCOUNTER — APPOINTMENT (OUTPATIENT)
Dept: NEUROLOGY | Facility: CLINIC | Age: 61
End: 2021-08-18
Payer: MEDICAID

## 2021-08-18 ENCOUNTER — NON-APPOINTMENT (OUTPATIENT)
Age: 61
End: 2021-08-18

## 2021-08-18 VITALS
TEMPERATURE: 97.8 F | HEART RATE: 74 BPM | DIASTOLIC BLOOD PRESSURE: 80 MMHG | HEIGHT: 62 IN | BODY MASS INDEX: 31.83 KG/M2 | WEIGHT: 173 LBS | SYSTOLIC BLOOD PRESSURE: 120 MMHG

## 2021-08-18 PROCEDURE — 99204 OFFICE O/P NEW MOD 45 MIN: CPT

## 2021-08-18 RX ORDER — OXYCODONE AND ACETAMINOPHEN 5; 325 MG/1; MG/1
5-325 TABLET ORAL
Qty: 20 | Refills: 0 | Status: DISCONTINUED | COMMUNITY
Start: 2021-05-10 | End: 2021-08-18

## 2021-08-18 RX ORDER — OXYCODONE AND ACETAMINOPHEN 5; 325 MG/1; MG/1
5-325 TABLET ORAL
Qty: 20 | Refills: 0 | Status: DISCONTINUED | COMMUNITY
Start: 2021-04-26 | End: 2021-08-18

## 2021-09-21 ENCOUNTER — APPOINTMENT (OUTPATIENT)
Dept: INTERNAL MEDICINE | Facility: CLINIC | Age: 61
End: 2021-09-21

## 2021-10-03 ENCOUNTER — TRANSCRIPTION ENCOUNTER (OUTPATIENT)
Age: 61
End: 2021-10-03

## 2021-10-04 ENCOUNTER — TRANSCRIPTION ENCOUNTER (OUTPATIENT)
Age: 61
End: 2021-10-04

## 2021-10-11 ENCOUNTER — OUTPATIENT (OUTPATIENT)
Dept: OUTPATIENT SERVICES | Facility: HOSPITAL | Age: 61
LOS: 1 days | End: 2021-10-11
Payer: MEDICAID

## 2021-10-11 ENCOUNTER — APPOINTMENT (OUTPATIENT)
Dept: GASTROENTEROLOGY | Facility: CLINIC | Age: 61
End: 2021-10-11
Payer: MEDICAID

## 2021-10-11 ENCOUNTER — APPOINTMENT (OUTPATIENT)
Dept: MRI IMAGING | Facility: CLINIC | Age: 61
End: 2021-10-11
Payer: MEDICAID

## 2021-10-11 VITALS
SYSTOLIC BLOOD PRESSURE: 123 MMHG | HEART RATE: 88 BPM | DIASTOLIC BLOOD PRESSURE: 81 MMHG | OXYGEN SATURATION: 99 % | HEIGHT: 62 IN | BODY MASS INDEX: 31.83 KG/M2 | WEIGHT: 173 LBS

## 2021-10-11 DIAGNOSIS — M54.16 RADICULOPATHY, LUMBAR REGION: ICD-10-CM

## 2021-10-11 DIAGNOSIS — S32.009A UNSPECIFIED FRACTURE OF UNSPECIFIED LUMBAR VERTEBRA, INITIAL ENCOUNTER FOR CLOSED FRACTURE: ICD-10-CM

## 2021-10-11 DIAGNOSIS — R93.7 ABNORMAL FINDINGS ON DIAGNOSTIC IMAGING OF OTHER PARTS OF MUSCULOSKELETAL SYSTEM: ICD-10-CM

## 2021-10-11 DIAGNOSIS — R10.13 EPIGASTRIC PAIN: ICD-10-CM

## 2021-10-11 DIAGNOSIS — Z90.49 ACQUIRED ABSENCE OF OTHER SPECIFIED PARTS OF DIGESTIVE TRACT: Chronic | ICD-10-CM

## 2021-10-11 DIAGNOSIS — R14.0 ABDOMINAL DISTENSION (GASEOUS): ICD-10-CM

## 2021-10-11 DIAGNOSIS — G43.909 MIGRAINE, UNSPECIFIED, NOT INTRACTABLE, W/OUT STATUS MIGRAINOSUS: ICD-10-CM

## 2021-10-11 DIAGNOSIS — R10.9 UNSPECIFIED ABDOMINAL PAIN: ICD-10-CM

## 2021-10-11 PROCEDURE — 99204 OFFICE O/P NEW MOD 45 MIN: CPT | Mod: 25

## 2021-10-11 PROCEDURE — 72148 MRI LUMBAR SPINE W/O DYE: CPT

## 2021-10-11 PROCEDURE — 72148 MRI LUMBAR SPINE W/O DYE: CPT | Mod: 26

## 2021-10-11 NOTE — ASSESSMENT
[FreeTextEntry1] : Cheli is a pleasant 61 year old female who is referred for an initial Gastroenterology motility consultation for the evaluation of progressive typical reflux symptoms, including heartburn and regurgitation as well as bloating, fecal retention and incomplete evacuation.  \par \par Plan:\par \par 1) Gastroenterology: some of her symptoms are typical of gastroesophageal reflux disease and she has had some response to acid suppressive therapy in the past, which is further suggestive of ongoing gastroesophageal reflux.\par \par At the present time, we will proceed with scheduling an EGD. We will plan to perform gastric biopsies to look for H Pyllori. In addition, she will have testing for Celiac. \par \par Cheli will also have a colonoscopy. I discussed with her that her GI symptoms may be due to the mediations she is taking, particularly those she takes for pain.Other etiology including proctitis, infiltrative process, etc will be ruled out. \par At today's visit, we also extensively counseled her on dietary/lifestyle management for presumed GERD/ dyspepsia.  We have also provided her with an educational handout with detailed information.\par \par 2) Disposition: Cheli will return to see me in 4-6 weeks after the above studies are obtained. My office will be in touch with her as results become available.  \par \par In the meantime, she should not hesitate to contact my office with any additional questions.\par

## 2021-10-11 NOTE — PHYSICAL EXAM
[No Acute Distress] : no acute distress [Well Nourished] : well nourished [Well-Appearing] : well-appearing [Well Developed] : well developed [Normal Sclera/Conjunctiva] : normal sclera/conjunctiva [PERRL] : pupils equal round and reactive to light [EOMI] : extraocular movements intact [Normal Outer Ear/Nose] : the outer ears and nose were normal in appearance [Normal Oropharynx] : the oropharynx was normal [No JVD] : no jugular venous distention [No Lymphadenopathy] : no lymphadenopathy [Supple] : supple [Thyroid Normal, No Nodules] : the thyroid was normal and there were no nodules present [No Respiratory Distress] : no respiratory distress  [No Accessory Muscle Use] : no accessory muscle use [Clear to Auscultation] : lungs were clear to auscultation bilaterally [Normal Rate] : normal rate  [Regular Rhythm] : with a regular rhythm [Normal S1, S2] : normal S1 and S2 [No Murmur] : no murmur heard [No Carotid Bruits] : no carotid bruits [No Abdominal Bruit] : a ~M bruit was not heard ~T in the abdomen [No Varicosities] : no varicosities [Pedal Pulses Present] : the pedal pulses are present [No Edema] : there was no peripheral edema [No Palpable Aorta] : no palpable aorta [No Extremity Clubbing/Cyanosis] : no extremity clubbing/cyanosis [Soft] : abdomen soft [Non Tender] : non-tender [Non-distended] : non-distended [No Masses] : no abdominal mass palpated [No HSM] : no HSM [Normal Bowel Sounds] : normal bowel sounds [Normal Posterior Cervical Nodes] : no posterior cervical lymphadenopathy [Normal Anterior Cervical Nodes] : no anterior cervical lymphadenopathy [No CVA Tenderness] : no CVA  tenderness [No Spinal Tenderness] : no spinal tenderness [Grossly Normal Strength/Tone] : grossly normal strength/tone [No Joint Swelling] : no joint swelling [No Rash] : no rash [Coordination Grossly Intact] : coordination grossly intact [No Focal Deficits] : no focal deficits [Normal Gait] : normal gait [Deep Tendon Reflexes (DTR)] : deep tendon reflexes were 2+ and symmetric [Normal Affect] : the affect was normal [Normal Insight/Judgement] : insight and judgment were intact

## 2021-10-11 NOTE — CONSULT LETTER
[Dear  ___] : Dear  [unfilled], [Consult Letter:] : I had the pleasure of evaluating your patient, [unfilled]. [Consult Closing:] : Thank you very much for allowing me to participate in the care of this patient.  If you have any questions, please do not hesitate to contact me. [Sincerely,] : Sincerely, [FreeTextEntry1] : Cheli is a pleasant 61 year old female who is referred for an initial Gastroenterology motility consultation for the evaluation of progressive typical reflux symptoms, including heartburn and regurgitation as well as bloating, fecal retention and incomplete evacuation.  \par \par Plan:\par \par 1) Gastroenterology: some of her symptoms are typical of gastroesophageal reflux disease and she has had some response to acid suppressive therapy in the past, which is further suggestive of ongoing gastroesophageal reflux.\par \par At the present time, we will proceed with scheduling an EGD. We will plan to perform gastric biopsies to look for H Pyllori. In addition, she will have testing for Celiac. \par \par Cheli will also have a colonoscopy. I discussed with her that her GI symptoms may be due to the mediations she is taking, particularly those she takes for pain.Other etiology including proctitis, infiltrative process, etc will be ruled out. \par At today's visit, we also extensively counseled her on dietary/lifestyle management for presumed GERD/ dyspepsia.  We have also provided her with an educational handout with detailed information.\par \par 2) Disposition: Cheli will return to see me in 4-6 weeks after the above studies are obtained. My office will be in touch with her as results become available.  \par \par In the meantime, she should not hesitate to contact my office with any additional questions. [FreeTextEntry3] : Celina Chappell MD\par Gastroenterology, Hepatology and Motility\par

## 2021-10-11 NOTE — REASON FOR VISIT
[Initial Evaluation] : an initial evaluation [FreeTextEntry1] : abdominal discomfort, bloating, fecal urgency, dyspepsia, exposure risk

## 2021-10-11 NOTE — HISTORY OF PRESENT ILLNESS
[de-identified] : Cheli is a very pleasant 61 year old female with MMP including fibromyalgic- followed by rheum, overactive thyroids- followed bykristian jean baptiste, hypertension, and DL. She is referred by Dr Atkins for initial GI evaluation for a constellation of nonspecific GI symptoms including dyspepsia, abdominal discomfort, change in bowel habits and reflux. She has recently been followed closely by Neurology for frequent headaches and previously had migraine headaches. She takes NSAIDs on and off and previously was taking Celebrex on a regular basis. no prior EGD/ colonoscopy. She denies family history of CRC or gastric cancer. She grew up in [place]. not previously tested for H Pylori. has taken Protonix daily for many years, reports it does not help. no dysphagia, odynophagia. no melnea or bleeding.

## 2021-10-12 ENCOUNTER — APPOINTMENT (OUTPATIENT)
Dept: NEUROLOGY | Facility: CLINIC | Age: 61
End: 2021-10-12

## 2021-10-22 DIAGNOSIS — Z11.59 ENCOUNTER FOR SCREENING FOR OTHER VIRAL DISEASES: ICD-10-CM

## 2021-10-26 ENCOUNTER — APPOINTMENT (OUTPATIENT)
Dept: INTERNAL MEDICINE | Facility: CLINIC | Age: 61
End: 2021-10-26
Payer: MEDICAID

## 2021-10-26 PROCEDURE — 99213 OFFICE O/P EST LOW 20 MIN: CPT | Mod: 95

## 2021-11-01 ENCOUNTER — RX RENEWAL (OUTPATIENT)
Age: 61
End: 2021-11-01

## 2021-11-16 NOTE — H&P PST ADULT - NEGATIVE LYMPHATIC SYMPTOMS
no tender lymph nodes Isotretinoin Pregnancy And Lactation Text: This medication is Pregnancy Category X and is considered extremely dangerous during pregnancy. It is unknown if it is excreted in breast milk.

## 2021-11-24 RX ORDER — DULOXETINE HYDROCHLORIDE 60 MG/1
60 CAPSULE, DELAYED RELEASE PELLETS ORAL
Qty: 180 | Refills: 0 | Status: ACTIVE | COMMUNITY
Start: 2021-07-30 | End: 1900-01-01

## 2021-11-29 ENCOUNTER — APPOINTMENT (OUTPATIENT)
Dept: ORTHOPEDIC SURGERY | Facility: CLINIC | Age: 61
End: 2021-11-29

## 2021-12-01 ENCOUNTER — APPOINTMENT (OUTPATIENT)
Dept: GASTROENTEROLOGY | Facility: AMBULATORY MEDICAL SERVICES | Age: 61
End: 2021-12-01

## 2021-12-20 ENCOUNTER — RX RENEWAL (OUTPATIENT)
Age: 61
End: 2021-12-20

## 2022-01-03 ENCOUNTER — APPOINTMENT (OUTPATIENT)
Dept: ORTHOPEDIC SURGERY | Facility: CLINIC | Age: 62
End: 2022-01-03

## 2022-01-14 ENCOUNTER — APPOINTMENT (OUTPATIENT)
Dept: INTERNAL MEDICINE | Facility: CLINIC | Age: 62
End: 2022-01-14
Payer: MEDICAID

## 2022-01-14 VITALS
SYSTOLIC BLOOD PRESSURE: 126 MMHG | TEMPERATURE: 97.5 F | DIASTOLIC BLOOD PRESSURE: 78 MMHG | HEART RATE: 94 BPM | RESPIRATION RATE: 14 BRPM | OXYGEN SATURATION: 98 % | HEIGHT: 62 IN | WEIGHT: 170 LBS | BODY MASS INDEX: 31.28 KG/M2

## 2022-01-14 PROCEDURE — 99213 OFFICE O/P EST LOW 20 MIN: CPT

## 2022-01-14 NOTE — ASSESSMENT
[FreeTextEntry1] : IVAN- rewew Meds. Understands risk of Meds. Istop checked\par Migraines- followw up with neuro\par chronic thierry- follo with with ortho

## 2022-01-18 ENCOUNTER — APPOINTMENT (OUTPATIENT)
Dept: NEUROLOGY | Facility: CLINIC | Age: 62
End: 2022-01-18
Payer: MEDICAID

## 2022-01-18 PROCEDURE — 99213 OFFICE O/P EST LOW 20 MIN: CPT | Mod: 95

## 2022-01-18 NOTE — REASON FOR VISIT
[Home] : at home, [unfilled] , at the time of the visit. [Medical Office: ([facility])___] : at the medical office located in  [Follow-Up: _____] : a [unfilled] follow-up visit

## 2022-01-18 NOTE — DISCUSSION/SUMMARY
[FreeTextEntry1] : 61 year old woman hx of chronic migraine headaches, recurrent, more than 15 per month, Ubrelvy 100 mg po PRN, very helpful. Will renew.\par iscussed the use of a regular therapy, has tried several in the past, without success. Thus him to respond to CGRs.\par Plan: Ajovy 225 mg Sq monthly\par RTO 3 months

## 2022-01-21 NOTE — ASSESSMENT
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 Failed previous preventive therapies, including Botox, did not tolerate Imitrex, Maxalt stopped working.\par Plan: Trial with Aimovig 70 mg , patient agrees.\par Samples and prescription for Ubrelvy 100 mg po PRN, can repeat in 2 hours, max is 200 mg/day. \par RTO 4-5 weeks.

## 2022-01-21 NOTE — PHYSICAL EXAM
[General Appearance - Alert] : alert [General Appearance - In No Acute Distress] : in no acute distress [Oriented To Time, Place, And Person] : oriented to person, place, and time [Impaired Insight] : insight and judgment were intact [Affect] : the affect was normal [Person] : oriented to person [Place] : oriented to place [Time] : oriented to time [Concentration Intact] : normal concentrating ability [Visual Intact] : visual attention was ~T not ~L decreased [Naming Objects] : no difficulty naming common objects [Repeating Phrases] : no difficulty repeating a phrase [Writing A Sentence] : no difficulty writing a sentence [Fluency] : fluency intact [Comprehension] : comprehension intact [Reading] : reading intact [Past History] : adequate knowledge of personal past history [Cranial Nerves Optic (II)] : visual acuity intact bilaterally,  visual fields full to confrontation, pupils equal round and reactive to light [Cranial Nerves Oculomotor (III)] : extraocular motion intact [Cranial Nerves Trigeminal (V)] : facial sensation intact symmetrically [Cranial Nerves Facial (VII)] : face symmetrical [Cranial Nerves Vestibulocochlear (VIII)] : hearing was intact bilaterally [Cranial Nerves Glossopharyngeal (IX)] : tongue and palate midline [Cranial Nerves Accessory (XI - Cranial And Spinal)] : head turning and shoulder shrug symmetric [Cranial Nerves Hypoglossal (XII)] : there was no tongue deviation with protrusion [Motor Tone] : muscle tone was normal in all four extremities [Motor Strength] : muscle strength was normal in all four extremities [No Muscle Atrophy] : normal bulk in all four extremities [Sensation Tactile Decrease] : light touch was intact [Balance] : balance was intact [Abnormal Walk] : normal gait [2+] : Ankle jerk left 2+ [Sclera] : the sclera and conjunctiva were normal [PERRL With Normal Accommodation] : pupils were equal in size, round, reactive to light, with normal accommodation [Optic Disc Abnormality] : the optic disc were normal in size and color [Extraocular Movements] : extraocular movements were intact [Full Visual Field] : full visual field [Outer Ear] : the ears and nose were normal in appearance [Hearing Threshold Finger Rub Not [place]] : hearing was normal [Neck Appearance] : the appearance of the neck was normal [] : the neck was supple [Neck Cervical Mass (___cm)] : no neck mass was observed [Arterial Pulses Carotid] : carotid pulses were normal with no bruits [Full Pulse] : the pedal pulses are present [Edema] : there was no peripheral edema [Past-pointing] : there was no past-pointing [Tremor] : no tremor present [Plantar Reflex Right Only] : normal on the right [Plantar Reflex Left Only] : normal on the left

## 2022-01-21 NOTE — HISTORY OF PRESENT ILLNESS
[Chronic Headache] : chronic headache [Nausea] : nausea [Photophobia] : photophobia [Phonophobia] : phonophobia [Daily] : daily [< 4 hours] : < 4 hours [improved] : improved [Stable] : The patient reports ~his/her~ symptoms since the last visit are stable [FreeTextEntry1] : 61 year old woman hx of HTN, HLD, fibromyalgia c/o frequent headaches. has had migraine headaches for many years, over the last 2 years, the headaches have become more frequent pretty much daily, and occur in the morning, throughout the day, at night. Vigorously with her, without with it. Has tried other treatments in the past, evaluated by other neurologists, had an MRI of the brain which was negative several years ago,treated with preventative therapy,including beta blockers, topiramate, SSRIs, the last one being with Botox. Helped but didn't make the headache away.\par Has also tried Imitrex 100 mg, had side effects, tightness across her chest, also tried Maxalt, which initially helped, but then stopped working. Presently simply lies down, dressed to rest relax.Perhaps takes Excedrin, sometimes takes tramadol.\par most common triggers stress, has had some personal health problems, recent shoulder surgery, chronic back pain, fibromyalgia, dealing with a demented family member.\par  [Aura] : no aura [Dizziness] : no dizziness [Vomiting] : no Vomiting [Scotoma] : no scotoma [Numbness] : no numbness [Tingling] : no tingling [Weakness] : no weakness [Scalp Tenderness] : no scalp tenderness

## 2022-01-21 NOTE — REVIEW OF SYSTEMS
[As Noted in HPI] : as noted in HPI [Feeling Tired] : feeling tired [Arthralgias] : arthralgias [Lower Back Pain] : lower back pain [Sleep Disturbances] : sleep disturbances [Anxiety] : anxiety [Negative] : Heme/Lymph [Suicidal] : not suicidal

## 2022-01-21 NOTE — PROCEDURE
[FreeTextEntry1] : After obtaining verbal consent. Aimovig 70 mg SQ RUE, well tolerated, no complications.\par

## 2022-03-05 NOTE — ASU PREOP CHECKLIST - AS BP NONINV METHOD
Goal Outcome Evaluation:  Plan of Care Reviewed With: patient        Progress: no change  Outcome Evaluation: Patient admitted this shift from ed. Patients vitals stable. Patient reports some pain and PRN medication given. Patient is confused to situation, an assist x1 and has a purrwicc. Patient reports leg pain and chronic back pain. Will continue to monitor.  Problem: Adult Inpatient Plan of Care  Goal: Plan of Care Review  Recent Flowsheet Documentation  Taken 3/5/2022 0428 by Leola Burnett, RN  Progress: no change  Plan of Care Reviewed With: patient  Outcome Evaluation: Patient admitted this shift from ed. Patients vitals stable. Patient reports some pain and PRN medication given. Patient is confused to situation, an assist x1 and has a purrwicc. Patient reports leg pain and chronic back pain. Will continue to monitor.  3/4/2022 2326 by Leola Burnett, RN  Outcome: Unable to Meet, Plan Revised  Goal: Patient-Specific Goal (Individualized)  Outcome: Unable to Meet, Plan Revised  Goal: Absence of Hospital-Acquired Illness or Injury  Outcome: Unable to Meet, Plan Revised  Goal: Optimal Comfort and Wellbeing  Outcome: Unable to Meet, Plan Revised  Goal: Readiness for Transition of Care  Outcome: Unable to Meet, Plan Revised     Problem: Fall Injury Risk  Goal: Absence of Fall and Fall-Related Injury  Outcome: Unable to Meet, Plan Revised     Problem: Diabetes Comorbidity  Goal: Blood Glucose Level Within Desired Range  Outcome: Unable to Meet, Plan Revised     Problem: Heart Failure Comorbidity  Goal: Maintenance of Heart Failure Symptom Control  Outcome: Unable to Meet, Plan Revised     Problem: Hypertension Comorbidity  Goal: Blood Pressure in Desired Range  Outcome: Unable to Meet, Plan Revised     Problem: Pain Chronic (Persistent) (Comorbidity Management)  Goal: Acceptable Pain Control and Functional Ability  Outcome: Unable to Meet, Plan Revised     Problem: Skin Injury Risk Increased  Goal: Skin Health  and Integrity  Outcome: Unable to Meet, Plan Revised      electronic

## 2022-03-07 ENCOUNTER — RX RENEWAL (OUTPATIENT)
Age: 62
End: 2022-03-07

## 2022-04-11 PROBLEM — Z11.59 SCREENING FOR VIRAL DISEASE: Status: RESOLVED | Noted: 2021-10-22 | Resolved: 2022-04-11

## 2022-04-15 ENCOUNTER — APPOINTMENT (OUTPATIENT)
Dept: INTERNAL MEDICINE | Facility: CLINIC | Age: 62
End: 2022-04-15
Payer: MEDICAID

## 2022-04-15 VITALS
HEART RATE: 80 BPM | HEIGHT: 62 IN | WEIGHT: 170 LBS | RESPIRATION RATE: 16 BRPM | OXYGEN SATURATION: 98 % | SYSTOLIC BLOOD PRESSURE: 122 MMHG | TEMPERATURE: 97.8 F | DIASTOLIC BLOOD PRESSURE: 70 MMHG | BODY MASS INDEX: 31.28 KG/M2

## 2022-04-15 PROCEDURE — 99214 OFFICE O/P EST MOD 30 MIN: CPT

## 2022-04-15 NOTE — ASSESSMENT
[FreeTextEntry1] : hypothyroidism- followed by endo. \par hyperlipidemia- check lipid panel\par Fibromyalgia- followed by rheum ( DR. Paige)\par Migraines- follow by neuro ( Dr. Dyer)

## 2022-04-30 ENCOUNTER — TRANSCRIPTION ENCOUNTER (OUTPATIENT)
Age: 62
End: 2022-04-30

## 2022-06-10 ENCOUNTER — EMERGENCY (EMERGENCY)
Facility: HOSPITAL | Age: 62
LOS: 1 days | Discharge: HOME | End: 2022-06-10
Attending: STUDENT IN AN ORGANIZED HEALTH CARE EDUCATION/TRAINING PROGRAM
Payer: MEDICAID

## 2022-06-10 VITALS
HEIGHT: 61 IN | RESPIRATION RATE: 16 BRPM | OXYGEN SATURATION: 98 % | DIASTOLIC BLOOD PRESSURE: 75 MMHG | SYSTOLIC BLOOD PRESSURE: 124 MMHG | HEART RATE: 60 BPM | TEMPERATURE: 98 F | WEIGHT: 169.98 LBS

## 2022-06-10 DIAGNOSIS — Z90.49 ACQUIRED ABSENCE OF OTHER SPECIFIED PARTS OF DIGESTIVE TRACT: Chronic | ICD-10-CM

## 2022-06-10 LAB
ALBUMIN SERPL ELPH-MCNC: 4.5 G/DL — SIGNIFICANT CHANGE UP (ref 3.3–5)
ALP SERPL-CCNC: 81 U/L — SIGNIFICANT CHANGE UP (ref 40–120)
ALT FLD-CCNC: 23 U/L — SIGNIFICANT CHANGE UP (ref 10–45)
ANION GAP SERPL CALC-SCNC: 13 MMOL/L — SIGNIFICANT CHANGE UP (ref 5–17)
AST SERPL-CCNC: 20 U/L — SIGNIFICANT CHANGE UP (ref 10–40)
BASOPHILS # BLD AUTO: 0.07 K/UL — SIGNIFICANT CHANGE UP (ref 0–0.2)
BASOPHILS NFR BLD AUTO: 1.1 % — SIGNIFICANT CHANGE UP (ref 0–2)
BILIRUB SERPL-MCNC: 1.3 MG/DL — HIGH (ref 0.2–1.2)
BUN SERPL-MCNC: 10 MG/DL — SIGNIFICANT CHANGE UP (ref 7–23)
CALCIUM SERPL-MCNC: 9.5 MG/DL — SIGNIFICANT CHANGE UP (ref 8.4–10.5)
CHLORIDE SERPL-SCNC: 104 MMOL/L — SIGNIFICANT CHANGE UP (ref 96–108)
CO2 SERPL-SCNC: 27 MMOL/L — SIGNIFICANT CHANGE UP (ref 22–31)
CREAT SERPL-MCNC: 0.54 MG/DL — SIGNIFICANT CHANGE UP (ref 0.5–1.3)
EGFR: 105 ML/MIN/1.73M2 — SIGNIFICANT CHANGE UP
EOSINOPHIL # BLD AUTO: 0.1 K/UL — SIGNIFICANT CHANGE UP (ref 0–0.5)
EOSINOPHIL NFR BLD AUTO: 1.6 % — SIGNIFICANT CHANGE UP (ref 0–6)
FLUAV + FLUBV RNA SPEC NAA+PROBE: SIGNIFICANT CHANGE UP
FLUAV AG NPH QL: SIGNIFICANT CHANGE UP
FLUBV AG NPH QL: SIGNIFICANT CHANGE UP
GAS PNL BLDV: SIGNIFICANT CHANGE UP
GLUCOSE SERPL-MCNC: 94 MG/DL — SIGNIFICANT CHANGE UP (ref 70–99)
HCT VFR BLD CALC: 40.8 % — SIGNIFICANT CHANGE UP (ref 34.5–45)
HGB BLD-MCNC: 13.3 G/DL — SIGNIFICANT CHANGE UP (ref 11.5–15.5)
IMM GRANULOCYTES NFR BLD AUTO: 0.3 % — SIGNIFICANT CHANGE UP (ref 0–1.5)
LIDOCAIN IGE QN: 34 U/L — SIGNIFICANT CHANGE UP (ref 7–60)
LYMPHOCYTES # BLD AUTO: 2.3 K/UL — SIGNIFICANT CHANGE UP (ref 1–3.3)
LYMPHOCYTES # BLD AUTO: 37.2 % — SIGNIFICANT CHANGE UP (ref 13–44)
MCHC RBC-ENTMCNC: 28.7 PG — SIGNIFICANT CHANGE UP (ref 27–34)
MCHC RBC-ENTMCNC: 32.6 GM/DL — SIGNIFICANT CHANGE UP (ref 32–36)
MCV RBC AUTO: 87.9 FL — SIGNIFICANT CHANGE UP (ref 80–100)
MONOCYTES # BLD AUTO: 0.38 K/UL — SIGNIFICANT CHANGE UP (ref 0–0.9)
MONOCYTES NFR BLD AUTO: 6.1 % — SIGNIFICANT CHANGE UP (ref 2–14)
NEUTROPHILS # BLD AUTO: 3.32 K/UL — SIGNIFICANT CHANGE UP (ref 1.8–7.4)
NEUTROPHILS NFR BLD AUTO: 53.7 % — SIGNIFICANT CHANGE UP (ref 43–77)
NRBC # BLD: 0 /100 WBCS — SIGNIFICANT CHANGE UP (ref 0–0)
PLATELET # BLD AUTO: 239 K/UL — SIGNIFICANT CHANGE UP (ref 150–400)
POTASSIUM SERPL-MCNC: 3.3 MMOL/L — LOW (ref 3.5–5.3)
POTASSIUM SERPL-SCNC: 3.3 MMOL/L — LOW (ref 3.5–5.3)
PROT SERPL-MCNC: 7 G/DL — SIGNIFICANT CHANGE UP (ref 6–8.3)
RBC # BLD: 4.64 M/UL — SIGNIFICANT CHANGE UP (ref 3.8–5.2)
RBC # FLD: 12.4 % — SIGNIFICANT CHANGE UP (ref 10.3–14.5)
RSV RNA NPH QL NAA+NON-PROBE: SIGNIFICANT CHANGE UP
SARS-COV-2 RNA SPEC QL NAA+PROBE: SIGNIFICANT CHANGE UP
SODIUM SERPL-SCNC: 144 MMOL/L — SIGNIFICANT CHANGE UP (ref 135–145)
TROPONIN T, HIGH SENSITIVITY RESULT: <6 NG/L — SIGNIFICANT CHANGE UP (ref 0–51)
WBC # BLD: 6.19 K/UL — SIGNIFICANT CHANGE UP (ref 3.8–10.5)
WBC # FLD AUTO: 6.19 K/UL — SIGNIFICANT CHANGE UP (ref 3.8–10.5)

## 2022-06-10 PROCEDURE — 99220: CPT

## 2022-06-10 PROCEDURE — 71046 X-RAY EXAM CHEST 2 VIEWS: CPT | Mod: 26

## 2022-06-10 RX ORDER — ATORVASTATIN CALCIUM 80 MG/1
10 TABLET, FILM COATED ORAL DAILY
Refills: 0 | Status: DISCONTINUED | OUTPATIENT
Start: 2022-06-10 | End: 2022-06-14

## 2022-06-10 RX ORDER — ALPRAZOLAM 0.25 MG
0.25 TABLET ORAL ONCE
Refills: 0 | Status: DISCONTINUED | OUTPATIENT
Start: 2022-06-10 | End: 2022-06-10

## 2022-06-10 RX ORDER — TRAMADOL HYDROCHLORIDE 50 MG/1
50 TABLET ORAL DAILY
Refills: 0 | Status: COMPLETED | OUTPATIENT
Start: 2022-06-10 | End: 2022-06-17

## 2022-06-10 RX ORDER — KETOROLAC TROMETHAMINE 30 MG/ML
15 SYRINGE (ML) INJECTION ONCE
Refills: 0 | Status: DISCONTINUED | OUTPATIENT
Start: 2022-06-10 | End: 2022-06-10

## 2022-06-10 RX ORDER — SODIUM CHLORIDE 9 MG/ML
1000 INJECTION INTRAMUSCULAR; INTRAVENOUS; SUBCUTANEOUS ONCE
Refills: 0 | Status: COMPLETED | OUTPATIENT
Start: 2022-06-10 | End: 2022-06-10

## 2022-06-10 RX ORDER — POTASSIUM CHLORIDE 20 MEQ
40 PACKET (EA) ORAL ONCE
Refills: 0 | Status: COMPLETED | OUTPATIENT
Start: 2022-06-10 | End: 2022-06-10

## 2022-06-10 RX ORDER — DULOXETINE HYDROCHLORIDE 30 MG/1
120 CAPSULE, DELAYED RELEASE ORAL DAILY
Refills: 0 | Status: DISCONTINUED | OUTPATIENT
Start: 2022-06-10 | End: 2022-06-14

## 2022-06-10 RX ORDER — LIDOCAINE 4 G/100G
1 CREAM TOPICAL ONCE
Refills: 0 | Status: COMPLETED | OUTPATIENT
Start: 2022-06-10 | End: 2022-06-10

## 2022-06-10 RX ORDER — PANTOPRAZOLE SODIUM 20 MG/1
40 TABLET, DELAYED RELEASE ORAL
Refills: 0 | Status: DISCONTINUED | OUTPATIENT
Start: 2022-06-10 | End: 2022-06-14

## 2022-06-10 RX ORDER — DILTIAZEM HCL 120 MG
120 CAPSULE, EXT RELEASE 24 HR ORAL DAILY
Refills: 0 | Status: DISCONTINUED | OUTPATIENT
Start: 2022-06-10 | End: 2022-06-14

## 2022-06-10 RX ADMIN — Medication 15 MILLIGRAM(S): at 18:30

## 2022-06-10 RX ADMIN — Medication 40 MILLIEQUIVALENT(S): at 23:45

## 2022-06-10 RX ADMIN — LIDOCAINE 1 PATCH: 4 CREAM TOPICAL at 23:43

## 2022-06-10 RX ADMIN — Medication 15 MILLIGRAM(S): at 23:44

## 2022-06-10 RX ADMIN — Medication 0.25 MILLIGRAM(S): at 23:44

## 2022-06-10 RX ADMIN — SODIUM CHLORIDE 1000 MILLILITER(S): 9 INJECTION INTRAMUSCULAR; INTRAVENOUS; SUBCUTANEOUS at 18:30

## 2022-06-10 NOTE — ED CDU PROVIDER INITIAL DAY NOTE - NS ED ROS FT
Constitutional: No fever or chills  Eyes: No visual changes, eye pain   CV: + chest pain No lower extremity edema  Resp: No SOB no cough  GI: No abd pain. No nausea or vomiting.   : No dysuria, hematuria.   MSK: No musculoskeletal pain  Skin: No rash  Psych: No complaints   Neuro: No headache. +numbness/tingling. No weakness.  Endo: NO DM

## 2022-06-10 NOTE — ED PROVIDER NOTE - ATTENDING CONTRIBUTION TO CARE
I have personally seen and examined this patient.  I have fully participated in the care of this patient. I performed a substantive portion of the visit including all aspects of the medical decision making. I have reviewed all pertinent clinical information, including history, physical exam, plan and the Resident’s note and agree except as noted. - MD Puja.    61 yo F, HTN, non smoker, valve leak, p/w midsternal chest pain, of-off, anxious appearance, + associated with motion, may be msk vs. [employer] but given her risk, pt is willing to complete stress/ct coronary, cardiac echo, likely cdu for cardiac work up.

## 2022-06-10 NOTE — ED PROVIDER NOTE - OBJECTIVE STATEMENT
62 Y f H/O HLD, HTN, early MI in family, presenting with exertional/MSK chest pain. States 3 days ago experienced new popping ssensation whil getting out of pool, 62 Y f H/O HLD, HTN, early MI in family, presenting with exertional/MSK chest pain. States 3 days ago experienced new popping ssensation whil getting out of pool, shari 62 Y f H/O HLD, HTN, early MI in family, presenting with exertional/MSK chest pain. States 3 days ago experienced new popping ssensation whil getting out of pool, denies any exertional chest pain, nausea, vomiting, diaphoresis

## 2022-06-10 NOTE — ED CDU PROVIDER INITIAL DAY NOTE - OBJECTIVE STATEMENT
62 Y f H/O HLD, HTN, Fibromyalgia, Migraines, Hyperthyroidism, right shoulder surgery presenting with chest pain x 3 days. States 3 days ago experienced non-radiating, chest pressure and popping sensation when getting in to the pool. States that pain is worse with moving. Also endorses 2 day of numbness/tingling to her left hand. Went to urgent care and was sent to the ED for evaluation. Denies SOB, nausea, vomiting, diaphoresis, fever, cough, abdominal pain.  ED course: Trop <6. Pain reproducible to palpation. Plan for CTC in CDU. 62 Y f H/O HLD, HTN, Fibromyalgia, Migraines, Hyperthyroidism, right shoulder surgery presenting with chest pain x 3 days. States 3 days ago experienced non-radiating, chest pressure and popping sensation when getting in to the pool. States that pain is worse with moving. Also endorses 2 day of numbness/tingling to her left hand. Went to urgent care and was sent to the ED for evaluation. Saw her cardiologist Dr. Cyril Alvarado a month ago, may have had an echocardiogram at the time. Son with history of heart attack in his 20s. Denies SOB, nausea, vomiting, diaphoresis, fever, cough, abdominal pain.   ED course: Trop <6. Pain reproducible to palpation. Plan for CTC in CDU.

## 2022-06-10 NOTE — ED ADULT NURSE NOTE - OBJECTIVE STATEMENT
Patient states she was getting into a pool a few days ago when she felt a "pop" by her mid chest, followed by constant CP that has now worsened. Patient states she went to  today and was sent here. 4 baby ASA and nitro given by EMS en route. Patient states, 3 years ago, her son suffered a heart attack with similar symptoms. A&Ox4, in no distress, denies dizziness/lightheadedness, respirations even and unlabored on room air, + SOB, + tingling to her LUE, denies n/v/d, denies fevers/chills, denies recent illness.

## 2022-06-10 NOTE — ED CDU PROVIDER INITIAL DAY NOTE - NSICDXPASTMEDICALHX_GEN_ALL_CORE_FT
PAST MEDICAL HISTORY:  Fibromyalgia     GERD (gastroesophageal reflux disease)     HLD (hyperlipidemia)     HTN (hypertension)     Hyperthyroidism on Methimazole    Obesity

## 2022-06-10 NOTE — ED PROVIDER NOTE - NS ED ROS FT
GENERAL: No fever or chills  EYES: No change in vision  HEENT: No trouble swallowing or speaking  CARDIAC: + chest pain  PULMONARY: No cough or SOB  GI: No abdominal pain, no nausea or no vomiting, no diarrhea or constipation  : No changes in urination  SKIN: No rashes  NEURO: No headache, no numbness  MSK: No joint pain  Otherwise as HPI or negative.

## 2022-06-10 NOTE — ED CDU PROVIDER INITIAL DAY NOTE - PHYSICAL EXAMINATION
Physical Exam:  	General: NAD, Conversive  	Eyes: EOMI, Conjunctiva and sclera clear  	Neck: No JVD  	Lungs: Clear to auscultation bilaterally, no wheeze, no rhonchi  	Heart: Normal S1, S2, no murmurs  	Abdomen: Soft, nontender, nondistended  	Extremities: 2+ peripheral pulses, no edema  	Psych: AAO X3  Neurologic: Sensation grossly intact, moving all extremities, no gross deficits Physical Exam:  	General: NAD, Conversive  	Eyes: EOMI, Conjunctiva and sclera clear  	Neck: No JVD  	Lungs: Clear to auscultation bilaterally, no wheeze, no rhonchi  	Heart: Normal S1, S2, no murmurs. Anterior chest tender to palpation. No obvious rash to site of pain.   	Abdomen: Soft, nontender, nondistended  	Extremities: 2+ peripheral pulses, no edema  	Psych: AAO X3  Neurologic: Sensation grossly intact, moving all extremities, no gross deficits

## 2022-06-10 NOTE — ED CDU PROVIDER INITIAL DAY NOTE - MEDICAL DECISION MAKING DETAILS
Attending/MD Puja. 63 yo F, with HTN, non smoker, p/w non exerstional chest tightness, radiating to the left arms, stating valve regurgitation but not clearly recall the name of diagnosis, stress test 1 mo ago, unsure the result, dx including msk, costco, peluritis, anxiety, coronary but given no other risks and not hypoxic or respiratory distress, makes PE less likely. CDU for observation, CT coronary and cardiologist consult due to no stress test on Saturday. dispo accordingly.

## 2022-06-10 NOTE — ED PROVIDER NOTE - NSICDXPASTMEDICALHX_GEN_ALL_CORE_FT
79 PAST MEDICAL HISTORY:  Fibromyalgia     GERD (gastroesophageal reflux disease)     HLD (hyperlipidemia)     HTN (hypertension)     Hyperthyroidism on Methimazole    Obesity

## 2022-06-10 NOTE — ED PROVIDER NOTE - CLINICAL SUMMARY MEDICAL DECISION MAKING FREE TEXT BOX
62 Y F H/O HLD, HTN, early Fhx of MI presenting with chest pain, primary concern for MSK, in setting of popping sensation pain with UE motion, recent stress test unable to be performed due to inability to run on treadmill, low clinical concern ACS. common labs, CXr, likely CDU for ct coronary,.

## 2022-06-10 NOTE — ED CDU PROVIDER INITIAL DAY NOTE - PROGRESS NOTE DETAILS
I spoke with on call provider for patient's cardiologist. Agreed with CTC and outpatient follow up in the office. - Monica Pierce PA-C

## 2022-06-10 NOTE — ED CDU PROVIDER INITIAL DAY NOTE - ATTENDING APP SHARED VISIT CONTRIBUTION OF CARE
ATTENDING, MD Puja: I have personally performed a face to face diagnostic evaluation on this patient. I performed a substantive portion of the visit including all aspects of the medical decision making. I have reviewed the ACP note and agree with the history, exam, and plan of care, except as noted here. Progress notes and further evaluation to be reviewed by observation and discharging attending.

## 2022-06-10 NOTE — ED ADULT NURSE NOTE - PAIN RATING/NUMBER SCALE (0-10): REST
.Social Work Follow-Up  [facility]    Data/Intervention:  Patient Name:  Jennifer Cervantes  /Age:  1998 (23 year old)    Reason for Follow-Up:  Transportation    Collaborated With:    -[employer] Triage  -[facility]  -Patient    Intervention/Education/Resources Provided:  Patient has an appointment at 11 am and is needing assistance with transportation. SW called patient's insurance and ride was arranged with [employer]. SW called patient and informed them of their ride details. SW encouraged patient to call if there are any issues with their ride.    Assessment/Plan:  SW will continue to remain available as needed.  Previously provided patient/family with writer's contact information and availability.       CARLOS Chavez,LGSW  Hematology/Oncology Social Worker  Phone:[phone] Pager: [phone]       5

## 2022-06-11 VITALS — SYSTOLIC BLOOD PRESSURE: 137 MMHG | HEART RATE: 82 BPM | DIASTOLIC BLOOD PRESSURE: 82 MMHG

## 2022-06-11 LAB
A1C WITH ESTIMATED AVERAGE GLUCOSE RESULT: 5.6 % — SIGNIFICANT CHANGE UP (ref 4–5.6)
CHOLEST SERPL-MCNC: 155 MG/DL — SIGNIFICANT CHANGE UP
ESTIMATED AVERAGE GLUCOSE: 114 MG/DL — SIGNIFICANT CHANGE UP (ref 68–114)
HDLC SERPL-MCNC: 48 MG/DL — LOW
LIPID PNL WITH DIRECT LDL SERPL: 89 MG/DL — SIGNIFICANT CHANGE UP
NON HDL CHOLESTEROL: 107 MG/DL — SIGNIFICANT CHANGE UP
TRIGL SERPL-MCNC: 88 MG/DL — SIGNIFICANT CHANGE UP

## 2022-06-11 PROCEDURE — G0378: CPT

## 2022-06-11 PROCEDURE — 84484 ASSAY OF TROPONIN QUANT: CPT

## 2022-06-11 PROCEDURE — 75574 CT ANGIO HRT W/3D IMAGE: CPT | Mod: ME

## 2022-06-11 PROCEDURE — G1004: CPT

## 2022-06-11 PROCEDURE — 84132 ASSAY OF SERUM POTASSIUM: CPT

## 2022-06-11 PROCEDURE — 71046 X-RAY EXAM CHEST 2 VIEWS: CPT

## 2022-06-11 PROCEDURE — 80053 COMPREHEN METABOLIC PANEL: CPT

## 2022-06-11 PROCEDURE — 85014 HEMATOCRIT: CPT

## 2022-06-11 PROCEDURE — 83690 ASSAY OF LIPASE: CPT

## 2022-06-11 PROCEDURE — 36415 COLL VENOUS BLD VENIPUNCTURE: CPT

## 2022-06-11 PROCEDURE — 82330 ASSAY OF CALCIUM: CPT

## 2022-06-11 PROCEDURE — 83036 HEMOGLOBIN GLYCOSYLATED A1C: CPT

## 2022-06-11 PROCEDURE — 82435 ASSAY OF BLOOD CHLORIDE: CPT

## 2022-06-11 PROCEDURE — 83605 ASSAY OF LACTIC ACID: CPT

## 2022-06-11 PROCEDURE — 82803 BLOOD GASES ANY COMBINATION: CPT

## 2022-06-11 PROCEDURE — 99217: CPT

## 2022-06-11 PROCEDURE — 84295 ASSAY OF SERUM SODIUM: CPT

## 2022-06-11 PROCEDURE — 85025 COMPLETE CBC W/AUTO DIFF WBC: CPT

## 2022-06-11 PROCEDURE — 82947 ASSAY GLUCOSE BLOOD QUANT: CPT

## 2022-06-11 PROCEDURE — 80061 LIPID PANEL: CPT

## 2022-06-11 PROCEDURE — 87637 SARSCOV2&INF A&B&RSV AMP PRB: CPT

## 2022-06-11 PROCEDURE — 99285 EMERGENCY DEPT VISIT HI MDM: CPT | Mod: 25

## 2022-06-11 PROCEDURE — 75574 CT ANGIO HRT W/3D IMAGE: CPT | Mod: 26,ME

## 2022-06-11 PROCEDURE — 85018 HEMOGLOBIN: CPT

## 2022-06-11 PROCEDURE — 96376 TX/PRO/DX INJ SAME DRUG ADON: CPT | Mod: XU

## 2022-06-11 PROCEDURE — 93005 ELECTROCARDIOGRAM TRACING: CPT | Mod: XU

## 2022-06-11 PROCEDURE — 96374 THER/PROPH/DIAG INJ IV PUSH: CPT | Mod: XU

## 2022-06-11 RX ORDER — PANTOPRAZOLE SODIUM 20 MG/1
1 TABLET, DELAYED RELEASE ORAL
Qty: 0 | Refills: 0 | DISCHARGE

## 2022-06-11 RX ORDER — DILTIAZEM HCL 120 MG
1 CAPSULE, EXT RELEASE 24 HR ORAL
Qty: 0 | Refills: 0 | DISCHARGE

## 2022-06-11 RX ORDER — METOPROLOL TARTRATE 50 MG
50 TABLET ORAL ONCE
Refills: 0 | Status: COMPLETED | OUTPATIENT
Start: 2022-06-11 | End: 2022-06-11

## 2022-06-11 RX ORDER — UBROGEPANT 100 MG/1
1 TABLET ORAL
Qty: 0 | Refills: 0 | DISCHARGE

## 2022-06-11 RX ORDER — TRAMADOL HYDROCHLORIDE 50 MG/1
1 TABLET ORAL
Qty: 0 | Refills: 0 | DISCHARGE

## 2022-06-11 RX ORDER — FLUTICASONE FUROATE AND VILANTEROL TRIFENATATE 100; 25 UG/1; UG/1
1 POWDER RESPIRATORY (INHALATION)
Qty: 0 | Refills: 0 | DISCHARGE

## 2022-06-11 RX ORDER — DULOXETINE HYDROCHLORIDE 30 MG/1
2 CAPSULE, DELAYED RELEASE ORAL
Qty: 0 | Refills: 0 | DISCHARGE

## 2022-06-11 RX ORDER — CHOLECALCIFEROL (VITAMIN D3) 125 MCG
1 CAPSULE ORAL
Qty: 0 | Refills: 0 | DISCHARGE

## 2022-06-11 RX ORDER — ALPRAZOLAM 0.25 MG
1 TABLET ORAL
Qty: 0 | Refills: 0 | DISCHARGE

## 2022-06-11 RX ORDER — ATORVASTATIN CALCIUM 80 MG/1
1 TABLET, FILM COATED ORAL
Qty: 0 | Refills: 0 | DISCHARGE

## 2022-06-11 RX ORDER — DULOXETINE HYDROCHLORIDE 30 MG/1
1 CAPSULE, DELAYED RELEASE ORAL
Qty: 0 | Refills: 0 | DISCHARGE

## 2022-06-11 RX ADMIN — TRAMADOL HYDROCHLORIDE 50 MILLIGRAM(S): 50 TABLET ORAL at 09:45

## 2022-06-11 RX ADMIN — DULOXETINE HYDROCHLORIDE 120 MILLIGRAM(S): 30 CAPSULE, DELAYED RELEASE ORAL at 09:45

## 2022-06-11 RX ADMIN — Medication 120 MILLIGRAM(S): at 09:45

## 2022-06-11 RX ADMIN — PANTOPRAZOLE SODIUM 40 MILLIGRAM(S): 20 TABLET, DELAYED RELEASE ORAL at 09:44

## 2022-06-11 RX ADMIN — Medication 50 MILLIGRAM(S): at 09:45

## 2022-06-11 NOTE — ED CDU PROVIDER SUBSEQUENT DAY NOTE - PROGRESS NOTE DETAILS
Patient seen at bedside in NAD.  VSS.  Chest pain improved however still experiences with palpation of area and with certain arm movements. Discussed results with pt. Spoke with Cards, Dr. Perez as well, reviewed CTA coronaries. Recommending adding low dose aspirin to regimen and continue Lipitor. Diet and lifestyle modifications also reviewed with pt. Has her own private Cardiologist to f/u with at [facility]. Pt will call to schedule expedited appt. Pt agreeable with plan. Sister here to take pt home. Patient stable for discharge.  Follow up instructions given, return to ED precautions reviewed. Importance of follow up emphasized, patient verbalized understanding.  All questions answered. Case dw Dr. José Luis Conway PA-C Patient seen at bedside in NAD.  VSS.  Chest pain improved however still experiences with palpation of area and with certain arm movements. Discussed results with pt. Spoke with Cards, Dr. Perez as well, reviewed CTA coronaries. Recommending adding low dose aspirin to regimen and continue Lipitor. Regarding questionable PFO can also be f/u outpt. Pts Cardiologist is associated with [facility], Dr. Cyril Alvarado. LM for provider to call back but was unsuccessful at contact. Diet and lifestyle modifications also reviewed with pt. Has her own private Cardiologist to f/u with at [facility]. Pt will call to schedule expedited appt. Pt agreeable with plan. Sister here to take pt home. Patient stable for discharge.  Follow up instructions given, return to ED precautions reviewed. Importance of follow up emphasized, patient verbalized understanding.  All questions answered. Case dw Dr. José Luis Conway PA-C Patient seen at bedside in NAD.  VSS.  Chest pain improved however still experiences occasionally with palpation of area and with certain arm movements. Discussed results with pt. Spoke with Cards, Dr. Perez as well, reviewed CTA coronaries. Recommending adding low dose aspirin to regimen and continue Lipitor. Regarding questionable PFO can also be f/u outpt. Pts Cardiologist is associated with [facility], Dr. yCril Alvarado. LM for provider to call back but was unsuccessful at contact. Pt will call to schedule expedited appt.Diet and lifestyle modifications also reviewed with pt.  Pt agreeable with plan. Sister here to take pt home. Patient stable for discharge.  Follow up instructions given, return to ED precautions reviewed. Importance of follow up emphasized, patient verbalized understanding.  All questions answered. Case dw Dr. José Luis Conway PA-C

## 2022-06-11 NOTE — ED CDU PROVIDER SUBSEQUENT DAY NOTE - HISTORY
No interval changes since initial CDU provider note. Pt without new complaint. NAD VSS. no events on tele. Plan for CTC in the setting of chest pain.  - TANIYA Pierce

## 2022-06-11 NOTE — ED CDU PROVIDER DISPOSITION NOTE - ATTENDING APP SHARED VISIT CONTRIBUTION OF CARE
Andrea: 62 year old female with presents with chest pain x 3 days.  non radiating, feels constant. no events on tele. ttp on exam, reproducible.  will get ctc, c/w telel monitoring.

## 2022-06-11 NOTE — ED CDU PROVIDER SUBSEQUENT DAY NOTE - MEDICAL DECISION MAKING DETAILS
José Luis: Patient with chest pain, no tele events overnight. will c/w tele monitoring. patient to have ctc done today. will reassess.

## 2022-06-11 NOTE — ED CDU PROVIDER DISPOSITION NOTE - CLINICAL COURSE
62 Y f H/O HLD, HTN, Fibromyalgia, Migraines, Hyperthyroidism, right shoulder surgery presenting with chest pain x 3 days. States 3 days ago experienced non-radiating, chest pressure and popping sensation when getting in to the pool. States that pain is worse with moving. Also endorses 2 day of numbness/tingling to her left hand. Went to urgent care and was sent to the ED for evaluation. Saw her cardiologist Dr. Cyril Alvarado a month ago, may have had an echocardiogram at the time. Son with history of heart attack in his 20s. Denies SOB, nausea, vomiting, diaphoresis, fever, cough, abdominal pain.   ED course: Trop <6. Pain reproducible to palpation. Plan for CTC in CDU. 62 Y f H/O HLD, HTN, Fibromyalgia, Migraines, Hyperthyroidism, right shoulder surgery presenting with chest pain x 3 days. States 3 days ago experienced non-radiating, chest pressure and popping sensation when getting in to the pool. States that pain is worse with moving. Also endorses 2 day of numbness/tingling to her left hand. Went to urgent care and was sent to the ED for evaluation. Saw her cardiologist Dr. Cyril Alvarado a month ago, may have had an echocardiogram at the time. Son with history of heart attack in his 20s. Denies SOB, nausea, vomiting, diaphoresis, fever, cough, abdominal pain.   ED course: Trop <6. Pain reproducible to palpation. Plan for CTC in CDU.  In the CDU pt with stable VS. CT coronaries with some evidence of non-calcified plaque and luminal stenosis of LAD, questionable PFO noted as well. Spoke with Cards, Dr. Perez, reviewed CTA coronaries. Recommending adding low dose aspirin to regimen and continue Lipitor. Diet and lifestyle modifications reviewed with pt as well. Has her own private Cardiologist to f/u with at [facility]. Pt will call to schedule expedited appt. Pt agreeable with plan.

## 2022-06-11 NOTE — ED ADULT NURSE REASSESSMENT NOTE - NS ED NURSE REASSESS COMMENT FT1
Pt received from NIK Ziegler. Pt oriented to CDU & plan of care was discussed. Pt A&O x 4. Pt in CDU for tele, CTC. Pt endorsing 6/10 chest pain at rest and 8/10 with movement, medicated as per MAR. Pt denies any SOB, dizziness or palpitations as of now. Pt on a cardiac monitor in NSR, HR in 60's. V/S stable, pt afebrile,  IV in place, patent and free of signs of infiltration. Pt resting in bed. Safety & comfort measures maintained. Call bell in reach. Will continue to monitor.

## 2022-06-11 NOTE — ED CDU PROVIDER SUBSEQUENT DAY NOTE - ATTENDING APP SHARED VISIT CONTRIBUTION OF CARE
I have personally performed a face to face diagnostic evaluation on this patient.  I have reviewed the ACP note and agree with the history, exam, and plan of care, except as noted.    Patient well appearing. mild chest pain at this time. no tele events overnight. will c/w tele monitorting. trop negative. CTC today.

## 2022-06-11 NOTE — ED CDU PROVIDER SUBSEQUENT DAY NOTE - PHYSICAL EXAMINATION
Physical Exam:  	General: NAD, Conversive  	Eyes: EOMI, Conjunctiva and sclera clear  	Neck: No JVD  	Lungs: Clear to auscultation bilaterally, no wheeze, no rhonchi  	Heart: Normal S1, S2, no murmurs. Anterior chest tender to palpation. No obvious rash to site of pain.   	Abdomen: Soft, nontender, nondistended  	Extremities: 2+ peripheral pulses, no edema  	Psych: AAO X3  Neurologic: Sensation grossly intact, moving all extremities, no gross deficits

## 2022-06-11 NOTE — ED CDU PROVIDER DISPOSITION NOTE - NSFOLLOWUPINSTRUCTIONS_ED_ALL_ED_FT
Hydrate.     You can Tylenol 650mg and/or Motrin 600mg every 6 hours as needed for pain.     We recommend you follow up with your primary care provider/cardiologist within the next 2-3 days, please bring all of your results with you.     Please return to the Emergency Department with new, worsening, or concerning symptoms, such as:  -Shortness of breath or trouble breathing  -Pressure, pain, tightness in chest  -Facial drooping, arm weakness, or speech difficulty   -Head injury or loss of consciousness   -Nonstop bleeding or an open wound     *More detailed information regarding your visit and discharge can be found by reviewing this packet You were seen and evaluated in the ED for chest pain.   Please make sure to follow up with your primary care doctor within 1-2 days and with the Cardiology specialist. The information for follow up can be found below. Bring a copy of all of your results with you to your follow up appointments.   Return to the ER as discussed if you develop any new or worsening symptoms.  You may take tylenol 1000mg and ibuprofen 400mg every 6 hours as needed for pain. Take ibuprofen with food.       Please start taking a daily baby Aspirin 81mg.

## 2022-06-11 NOTE — ED CDU PROVIDER DISPOSITION NOTE - PATIENT PORTAL LINK FT
You can access the FollowMyHealth Patient Portal offered by [facility] by registering at the following website: [email or web address]. By joining [employer]’s FollowMyHealth portal, you will also be able to view your health information using other applications (apps) compatible with our system.

## 2022-06-21 ENCOUNTER — APPOINTMENT (OUTPATIENT)
Dept: INTERNAL MEDICINE | Facility: CLINIC | Age: 62
End: 2022-06-21
Payer: MEDICAID

## 2022-06-21 VITALS
TEMPERATURE: 98.4 F | DIASTOLIC BLOOD PRESSURE: 70 MMHG | OXYGEN SATURATION: 99 % | WEIGHT: 169 LBS | RESPIRATION RATE: 14 BRPM | HEIGHT: 62 IN | HEART RATE: 75 BPM | BODY MASS INDEX: 31.1 KG/M2 | SYSTOLIC BLOOD PRESSURE: 124 MMHG

## 2022-06-21 PROCEDURE — 99214 OFFICE O/P EST MOD 30 MIN: CPT

## 2022-06-21 NOTE — ASSESSMENT
[FreeTextEntry1] : costochondritis- indomethacin TID with food. \par Follow up with cardiology.\par Follow up with symptoms worsen or persist.

## 2022-06-21 NOTE — HISTORY OF PRESENT ILLNESS
[de-identified] : Pt here today for follow up.\par Pt followed by card for chest pain. Pt started on asa and indomethacin. Pt followed by private cardiology\par Pt still with pain- tender nous when press on it.

## 2022-07-05 ENCOUNTER — RX RENEWAL (OUTPATIENT)
Age: 62
End: 2022-07-05

## 2022-07-14 ENCOUNTER — APPOINTMENT (OUTPATIENT)
Dept: INTERNAL MEDICINE | Facility: CLINIC | Age: 62
End: 2022-07-14

## 2022-07-14 VITALS
DIASTOLIC BLOOD PRESSURE: 86 MMHG | HEIGHT: 62 IN | TEMPERATURE: 97.1 F | RESPIRATION RATE: 14 BRPM | SYSTOLIC BLOOD PRESSURE: 130 MMHG | BODY MASS INDEX: 31.28 KG/M2 | HEART RATE: 80 BPM | OXYGEN SATURATION: 98 % | WEIGHT: 170 LBS

## 2022-07-14 DIAGNOSIS — M94.0 CHONDROCOSTAL JUNCTION SYNDROME [TIETZE]: ICD-10-CM

## 2022-07-14 PROCEDURE — 99396 PREV VISIT EST AGE 40-64: CPT

## 2022-07-14 NOTE — ASSESSMENT
[FreeTextEntry1] : Fibromyalgia- Cymbalta and tramadol pr.\par HTN- diltiazem daily\par GERD- pantoprazole\par Hyperthyroidism- followed by endo.\par Chest wall tendernous- improving. Follow up with rheum \par HCM-  needs screening colon.

## 2022-07-14 NOTE — PHYSICAL EXAM
[No Acute Distress] : no acute distress [Well Nourished] : well nourished [Well Developed] : well developed [Well-Appearing] : well-appearing [Normal Sclera/Conjunctiva] : normal sclera/conjunctiva [PERRL] : pupils equal round and reactive to light [EOMI] : extraocular movements intact [Normal Outer Ear/Nose] : the outer ears and nose were normal in appearance [Normal Oropharynx] : the oropharynx was normal [No JVD] : no jugular venous distention [No Lymphadenopathy] : no lymphadenopathy [Supple] : supple [Thyroid Normal, No Nodules] : the thyroid was normal and there were no nodules present [No Respiratory Distress] : no respiratory distress  [No Accessory Muscle Use] : no accessory muscle use [Clear to Auscultation] : lungs were clear to auscultation bilaterally [Normal Rate] : normal rate  [Regular Rhythm] : with a regular rhythm [Normal S1, S2] : normal S1 and S2 [No Murmur] : no murmur heard [No Carotid Bruits] : no carotid bruits [No Abdominal Bruit] : a ~M bruit was not heard ~T in the abdomen [No Varicosities] : no varicosities [Pedal Pulses Present] : the pedal pulses are present [No Edema] : there was no peripheral edema [No Palpable Aorta] : no palpable aorta [No Extremity Clubbing/Cyanosis] : no extremity clubbing/cyanosis [Soft] : abdomen soft [Non Tender] : non-tender [Non-distended] : non-distended [No HSM] : no HSM [Normal Bowel Sounds] : normal bowel sounds [Normal Posterior Cervical Nodes] : no posterior cervical lymphadenopathy [Normal Anterior Cervical Nodes] : no anterior cervical lymphadenopathy [No CVA Tenderness] : no CVA  tenderness [No Spinal Tenderness] : no spinal tenderness [No Joint Swelling] : no joint swelling [Grossly Normal Strength/Tone] : grossly normal strength/tone [No Rash] : no rash [Coordination Grossly Intact] : coordination grossly intact [No Focal Deficits] : no focal deficits [Normal Gait] : normal gait [Deep Tendon Reflexes (DTR)] : deep tendon reflexes were 2+ and symmetric [Normal Affect] : the affect was normal [Normal Insight/Judgement] : insight and judgment were intact [Normal Appearance] : normal in appearance [No Masses] : no palpable masses [No Axillary Lymphadenopathy] : no axillary lymphadenopathy [de-identified] : chest wall tendernous

## 2022-07-14 NOTE — HISTORY OF PRESENT ILLNESS
[de-identified] : Chest pains are better not disappeared.  \par Pt followed by cardiology. \par History of fibromyalgia- followed by rheumatology- schedule to see rheum ( Dr. Paige)

## 2022-07-14 NOTE — HEALTH RISK ASSESSMENT
[Very Good] : ~his/her~  mood as very good [No falls in past year] : Patient reported no falls in the past year [0] : 2) Feeling down, depressed, or hopeless: Not at all (0) [Patient reported mammogram was normal] : Patient reported mammogram was normal [MammogramDate] : 07/21 [ColonoscopyDate] : over 10 years ago

## 2022-07-15 LAB
25(OH)D3 SERPL-MCNC: 40.5 NG/ML
ALBUMIN SERPL ELPH-MCNC: 4.9 G/DL
ALP BLD-CCNC: 84 U/L
ALT SERPL-CCNC: 32 U/L
ANION GAP SERPL CALC-SCNC: 12 MMOL/L
APPEARANCE: CLEAR
AST SERPL-CCNC: 24 U/L
BACTERIA: NEGATIVE
BASOPHILS # BLD AUTO: 0.07 K/UL
BASOPHILS NFR BLD AUTO: 1.2 %
BILIRUB SERPL-MCNC: 1.3 MG/DL
BILIRUBIN URINE: NEGATIVE
BLOOD URINE: NEGATIVE
BUN SERPL-MCNC: 13 MG/DL
CALCIUM SERPL-MCNC: 9.6 MG/DL
CHLORIDE SERPL-SCNC: 103 MMOL/L
CHOLEST SERPL-MCNC: 186 MG/DL
CO2 SERPL-SCNC: 25 MMOL/L
COLOR: COLORLESS
CREAT SERPL-MCNC: 0.56 MG/DL
EGFR: 104 ML/MIN/1.73M2
EOSINOPHIL # BLD AUTO: 0.08 K/UL
EOSINOPHIL NFR BLD AUTO: 1.4 %
ESTIMATED AVERAGE GLUCOSE: 117 MG/DL
FOLATE SERPL-MCNC: 16.7 NG/ML
GLUCOSE QUALITATIVE U: NEGATIVE
GLUCOSE SERPL-MCNC: 98 MG/DL
HBA1C MFR BLD HPLC: 5.7 %
HCT VFR BLD CALC: 42.2 %
HDLC SERPL-MCNC: 60 MG/DL
HGB BLD-MCNC: 14.1 G/DL
HYALINE CASTS: 0 /LPF
IMM GRANULOCYTES NFR BLD AUTO: 0.3 %
KETONES URINE: NEGATIVE
LDLC SERPL CALC-MCNC: 106 MG/DL
LEUKOCYTE ESTERASE URINE: NEGATIVE
LYMPHOCYTES # BLD AUTO: 1.8 K/UL
LYMPHOCYTES NFR BLD AUTO: 31.5 %
MAN DIFF?: NORMAL
MCHC RBC-ENTMCNC: 29.6 PG
MCHC RBC-ENTMCNC: 33.4 GM/DL
MCV RBC AUTO: 88.5 FL
MICROSCOPIC-UA: NORMAL
MONOCYTES # BLD AUTO: 0.39 K/UL
MONOCYTES NFR BLD AUTO: 6.8 %
NEUTROPHILS # BLD AUTO: 3.36 K/UL
NEUTROPHILS NFR BLD AUTO: 58.8 %
NITRITE URINE: NEGATIVE
NONHDLC SERPL-MCNC: 126 MG/DL
PH URINE: 6.5
PLATELET # BLD AUTO: 244 K/UL
POTASSIUM SERPL-SCNC: 4 MMOL/L
PROT SERPL-MCNC: 7.3 G/DL
PROTEIN URINE: NEGATIVE
RBC # BLD: 4.77 M/UL
RBC # FLD: 12.5 %
RED BLOOD CELLS URINE: 0 /HPF
SODIUM SERPL-SCNC: 140 MMOL/L
SPECIFIC GRAVITY URINE: 1.01
SQUAMOUS EPITHELIAL CELLS: 0 /HPF
TRIGL SERPL-MCNC: 101 MG/DL
TSH SERPL-ACNC: 0.6 UIU/ML
UROBILINOGEN URINE: NORMAL
VIT B12 SERPL-MCNC: 686 PG/ML
WBC # FLD AUTO: 5.72 K/UL
WHITE BLOOD CELLS URINE: 0 /HPF

## 2022-07-18 ENCOUNTER — APPOINTMENT (OUTPATIENT)
Dept: NEUROLOGY | Facility: CLINIC | Age: 62
End: 2022-07-18

## 2022-08-08 ENCOUNTER — RX RENEWAL (OUTPATIENT)
Age: 62
End: 2022-08-08

## 2022-08-19 ENCOUNTER — RESULT REVIEW (OUTPATIENT)
Age: 62
End: 2022-08-19

## 2022-08-19 ENCOUNTER — APPOINTMENT (OUTPATIENT)
Dept: MAMMOGRAPHY | Facility: CLINIC | Age: 62
End: 2022-08-19

## 2022-08-19 ENCOUNTER — APPOINTMENT (OUTPATIENT)
Dept: ULTRASOUND IMAGING | Facility: CLINIC | Age: 62
End: 2022-08-19

## 2022-08-19 PROCEDURE — 77067 SCR MAMMO BI INCL CAD: CPT

## 2022-08-19 PROCEDURE — 77063 BREAST TOMOSYNTHESIS BI: CPT

## 2022-08-19 PROCEDURE — 76641 ULTRASOUND BREAST COMPLETE: CPT | Mod: 50

## 2022-08-22 LAB — HEMOCCULT STL QL IA: NEGATIVE

## 2022-09-03 ENCOUNTER — RX RENEWAL (OUTPATIENT)
Age: 62
End: 2022-09-03

## 2022-09-06 ENCOUNTER — NON-APPOINTMENT (OUTPATIENT)
Age: 62
End: 2022-09-06

## 2022-09-10 ENCOUNTER — EMERGENCY (EMERGENCY)
Facility: HOSPITAL | Age: 62
LOS: 1 days | Discharge: HOME | End: 2022-09-10
Attending: STUDENT IN AN ORGANIZED HEALTH CARE EDUCATION/TRAINING PROGRAM | Admitting: STUDENT IN AN ORGANIZED HEALTH CARE EDUCATION/TRAINING PROGRAM
Payer: MEDICAID

## 2022-09-10 VITALS
RESPIRATION RATE: 18 BRPM | SYSTOLIC BLOOD PRESSURE: 121 MMHG | DIASTOLIC BLOOD PRESSURE: 78 MMHG | TEMPERATURE: 98 F | HEART RATE: 68 BPM | OXYGEN SATURATION: 98 %

## 2022-09-10 VITALS
SYSTOLIC BLOOD PRESSURE: 114 MMHG | WEIGHT: 169.98 LBS | OXYGEN SATURATION: 99 % | HEART RATE: 76 BPM | TEMPERATURE: 99 F | DIASTOLIC BLOOD PRESSURE: 73 MMHG | RESPIRATION RATE: 18 BRPM | HEIGHT: 61 IN

## 2022-09-10 DIAGNOSIS — Z90.49 ACQUIRED ABSENCE OF OTHER SPECIFIED PARTS OF DIGESTIVE TRACT: Chronic | ICD-10-CM

## 2022-09-10 LAB
ALBUMIN SERPL ELPH-MCNC: 3.6 G/DL — SIGNIFICANT CHANGE UP (ref 3.3–5)
ALP SERPL-CCNC: 79 U/L — SIGNIFICANT CHANGE UP (ref 40–120)
ALT FLD-CCNC: 31 U/L — SIGNIFICANT CHANGE UP (ref 12–78)
ANION GAP SERPL CALC-SCNC: 4 MMOL/L — LOW (ref 5–17)
AST SERPL-CCNC: 17 U/L — SIGNIFICANT CHANGE UP (ref 15–37)
BASOPHILS # BLD AUTO: 0.06 K/UL — SIGNIFICANT CHANGE UP (ref 0–0.2)
BASOPHILS NFR BLD AUTO: 0.9 % — SIGNIFICANT CHANGE UP (ref 0–2)
BILIRUB SERPL-MCNC: 0.9 MG/DL — SIGNIFICANT CHANGE UP (ref 0.2–1.2)
BUN SERPL-MCNC: 9 MG/DL — SIGNIFICANT CHANGE UP (ref 7–23)
CALCIUM SERPL-MCNC: 8.7 MG/DL — SIGNIFICANT CHANGE UP (ref 8.5–10.1)
CHLORIDE SERPL-SCNC: 110 MMOL/L — HIGH (ref 96–108)
CO2 SERPL-SCNC: 30 MMOL/L — SIGNIFICANT CHANGE UP (ref 22–31)
CREAT SERPL-MCNC: 0.59 MG/DL — SIGNIFICANT CHANGE UP (ref 0.5–1.3)
CRP SERPL-MCNC: <3 MG/L — SIGNIFICANT CHANGE UP
EGFR: 102 ML/MIN/1.73M2 — SIGNIFICANT CHANGE UP
EOSINOPHIL # BLD AUTO: 0.11 K/UL — SIGNIFICANT CHANGE UP (ref 0–0.5)
EOSINOPHIL NFR BLD AUTO: 1.7 % — SIGNIFICANT CHANGE UP (ref 0–6)
ERYTHROCYTE [SEDIMENTATION RATE] IN BLOOD: 2 MM/HR — SIGNIFICANT CHANGE UP (ref 0–20)
GLUCOSE SERPL-MCNC: 109 MG/DL — HIGH (ref 70–99)
HCT VFR BLD CALC: 39.8 % — SIGNIFICANT CHANGE UP (ref 34.5–45)
HGB BLD-MCNC: 13.4 G/DL — SIGNIFICANT CHANGE UP (ref 11.5–15.5)
IMM GRANULOCYTES NFR BLD AUTO: 0.2 % — SIGNIFICANT CHANGE UP (ref 0–1.5)
LYMPHOCYTES # BLD AUTO: 1.89 K/UL — SIGNIFICANT CHANGE UP (ref 1–3.3)
LYMPHOCYTES # BLD AUTO: 29.3 % — SIGNIFICANT CHANGE UP (ref 13–44)
MCHC RBC-ENTMCNC: 29.5 PG — SIGNIFICANT CHANGE UP (ref 27–34)
MCHC RBC-ENTMCNC: 33.7 GM/DL — SIGNIFICANT CHANGE UP (ref 32–36)
MCV RBC AUTO: 87.7 FL — SIGNIFICANT CHANGE UP (ref 80–100)
MONOCYTES # BLD AUTO: 0.43 K/UL — SIGNIFICANT CHANGE UP (ref 0–0.9)
MONOCYTES NFR BLD AUTO: 6.7 % — SIGNIFICANT CHANGE UP (ref 2–14)
NEUTROPHILS # BLD AUTO: 3.96 K/UL — SIGNIFICANT CHANGE UP (ref 1.8–7.4)
NEUTROPHILS NFR BLD AUTO: 61.2 % — SIGNIFICANT CHANGE UP (ref 43–77)
NRBC # BLD: 0 /100 WBCS — SIGNIFICANT CHANGE UP (ref 0–0)
PLATELET # BLD AUTO: 246 K/UL — SIGNIFICANT CHANGE UP (ref 150–400)
POTASSIUM SERPL-MCNC: 3.9 MMOL/L — SIGNIFICANT CHANGE UP (ref 3.5–5.3)
POTASSIUM SERPL-SCNC: 3.9 MMOL/L — SIGNIFICANT CHANGE UP (ref 3.5–5.3)
PROT SERPL-MCNC: 6.9 G/DL — SIGNIFICANT CHANGE UP (ref 6–8.3)
RBC # BLD: 4.54 M/UL — SIGNIFICANT CHANGE UP (ref 3.8–5.2)
RBC # FLD: 12.5 % — SIGNIFICANT CHANGE UP (ref 10.3–14.5)
RHEUMATOID FACT SERPL-ACNC: <10 IU/ML — SIGNIFICANT CHANGE UP (ref 0–13)
SODIUM SERPL-SCNC: 144 MMOL/L — SIGNIFICANT CHANGE UP (ref 135–145)
TROPONIN I, HIGH SENSITIVITY RESULT: <3 NG/L — SIGNIFICANT CHANGE UP
URATE SERPL-MCNC: 4.5 MG/DL — SIGNIFICANT CHANGE UP (ref 2.5–7)
WBC # BLD: 6.46 K/UL — SIGNIFICANT CHANGE UP (ref 3.8–10.5)
WBC # FLD AUTO: 6.46 K/UL — SIGNIFICANT CHANGE UP (ref 3.8–10.5)

## 2022-09-10 PROCEDURE — 73564 X-RAY EXAM KNEE 4 OR MORE: CPT

## 2022-09-10 PROCEDURE — 71045 X-RAY EXAM CHEST 1 VIEW: CPT

## 2022-09-10 PROCEDURE — 85025 COMPLETE CBC W/AUTO DIFF WBC: CPT

## 2022-09-10 PROCEDURE — 99285 EMERGENCY DEPT VISIT HI MDM: CPT | Mod: 25

## 2022-09-10 PROCEDURE — 99284 EMERGENCY DEPT VISIT MOD MDM: CPT

## 2022-09-10 PROCEDURE — 93971 EXTREMITY STUDY: CPT

## 2022-09-10 PROCEDURE — 71045 X-RAY EXAM CHEST 1 VIEW: CPT | Mod: 26

## 2022-09-10 PROCEDURE — 36415 COLL VENOUS BLD VENIPUNCTURE: CPT

## 2022-09-10 PROCEDURE — 84550 ASSAY OF BLOOD/URIC ACID: CPT

## 2022-09-10 PROCEDURE — 85652 RBC SED RATE AUTOMATED: CPT

## 2022-09-10 PROCEDURE — 86431 RHEUMATOID FACTOR QUANT: CPT

## 2022-09-10 PROCEDURE — 80053 COMPREHEN METABOLIC PANEL: CPT

## 2022-09-10 PROCEDURE — 73590 X-RAY EXAM OF LOWER LEG: CPT

## 2022-09-10 PROCEDURE — 96374 THER/PROPH/DIAG INJ IV PUSH: CPT

## 2022-09-10 PROCEDURE — 86140 C-REACTIVE PROTEIN: CPT

## 2022-09-10 PROCEDURE — 73564 X-RAY EXAM KNEE 4 OR MORE: CPT | Mod: 26,LT

## 2022-09-10 PROCEDURE — 84484 ASSAY OF TROPONIN QUANT: CPT

## 2022-09-10 PROCEDURE — 73590 X-RAY EXAM OF LOWER LEG: CPT | Mod: 26,LT

## 2022-09-10 PROCEDURE — 93971 EXTREMITY STUDY: CPT | Mod: 26,LT

## 2022-09-10 RX ORDER — KETOROLAC TROMETHAMINE 30 MG/ML
30 SYRINGE (ML) INJECTION ONCE
Refills: 0 | Status: DISCONTINUED | OUTPATIENT
Start: 2022-09-10 | End: 2022-09-10

## 2022-09-10 RX ADMIN — Medication 30 MILLIGRAM(S): at 16:39

## 2022-09-10 NOTE — ED ADULT NURSE NOTE - NSIMPLEMENTINTERV_GEN_ALL_ED
Implemented All Universal Safety Interventions:  Kerrick to call system. Call bell, personal items and telephone within reach. Instruct patient to call for assistance. Room bathroom lighting operational. Non-slip footwear when patient is off stretcher. Physically safe environment: no spills, clutter or unnecessary equipment. Stretcher in lowest position, wheels locked, appropriate side rails in place.

## 2022-09-10 NOTE — ED PROVIDER NOTE - OBJECTIVE STATEMENT
61 y/o F with PMH fibromyalgia, GERD, HTN, HLD presents to ED for c/o L knee pain x 2-3 months. Denies recent injury trauma or fall. States had XRAY done by Rheumatology and was told she dos not have arthritis. has been on mobic for "inflammation" 7.5mg daily but feels it is not working. Was supposed to go on vacation today but states now oma so abd she can nt even walk. Pain is wrse when she moves. Difficulty bending knee. Pain extends from posterior L knee ot anterior L knee and down her shin/ posterior calf to ankle. Rosmery has been having intermittent L ankle swelling. 63 y/o F with PMH fibromyalgia, GERD, HTN, HLD presents to ED for c/o L knee pain x 2-3 months. Denies recent injury trauma or fall. States had XRAY done by Rheumatology and was told she dos not have arthritis. has been on mobic for "inflammation" 7.5mg daily but feels it is not working. Was supposed to go on vacation today but states now oma so abd she can nt even walk. Pain is worse when she moves. Difficulty bending knee. Pain extends from posterior L knee ot anterior L knee and down her shin/ posterior calf to ankle. Rosmery has been having intermittent L ankle swelling. Pt also reports she was admitted in june for chets pains and found to have MI. Now states she still has chest pain form time to time. 61 y/o F with PMH fibromyalgia, GERD, HTN, HLD presents to ED for c/o L knee pain x 2-3 months. Denies recent injury trauma or fall. States had XRAY done by Rheumatology and was told she dos not have arthritis. has been on mobic for "inflammation" 7.5mg daily but feels it is not working. Was supposed to go on vacation today but states now pain has progressed to the point she has difficulty walking. Pain is worse when she moves predominantly when bending knee. Pain extends from posterior L knee ot anterior L knee and down her shin/ posterior calf to ankle.

## 2022-09-10 NOTE — ED PROVIDER NOTE - PATIENT PORTAL LINK FT
You can access the FollowMyHealth Patient Portal offered by [facility] by registering at the following website: [email or web address]. By joining [employer]’s FollowMyHealth portal, you will also be able to view your health information using other applications (apps) compatible with our system.

## 2022-09-10 NOTE — ED PROVIDER NOTE - CLINICAL SUMMARY MEDICAL DECISION MAKING FREE TEXT BOX
patient has a longstanding hx of fibromyalgia following up with multiple providers including rheumatology presenting with left sided posterior knee pain radiating down her leg. no hx of blood clots/dvts/pe and no pe risk factors. She denies any NEW sx of chest pain, shortness of breath or back pain that she normally has. low suspicion for DVT given leg is not warm, erythematous and negative susana sign however us obtained to rule dvt out. xrays unremarkable and patient was noted to be ambulatory. indicated to the patient while autoimmune/inflammatory changes can certainly cause pain, this is an outpatient workup that can be followed up by her rheumatologist. encouraged to return if any worsening pain, inability to ambulate, fevers, trauma, NEW chest pain/sob or any other concerns. patient has a longstanding hx of fibromyalgia following up with multiple providers including rheumatology presenting with left sided posterior knee pain radiating down her leg. no hx of blood clots/dvts/pe and no pe risk factors. She denies any NEW sx of chest pain, shortness of breath or back pain that she normally has. low suspicion for DVT given leg is not warm, erythematous and negative susana sign however us obtained to rule dvt out. xrays unremarkable and patient was noted to be ambulatory. indicated to the patient while autoimmune/inflammatory changes can certainly cause pain, this is an outpatient workup that can be followed up by her rheumatologist and ortho as directed. encouraged to return if any worsening pain, inability to ambulate, fevers, trauma, NEW chest pain/sob or any other concerns.

## 2022-09-10 NOTE — ED ADULT TRIAGE NOTE - CHIEF COMPLAINT QUOTE
patient came in ED from home with c/o left knee pain radiating to left leg X almost 2 months. patient denies Hx of trauma or fall.

## 2022-09-10 NOTE — ED PROVIDER NOTE - PHYSICAL EXAMINATION
PE:   GEN: Awake, alert, interactive, NAD, non-toxic appearing.   HEAD: Atraumatic  EYES: Sclera white, conjunctiva pink, PERRLA  CARDIAC: Reg rate and rhythm, S1,S2, no murmur/rub/gallop. Strong central and peripheral pulses, Brisk cap refill, no evident pedal edema.   RESP: No distress noted. L/S clear = Bilat without accessory muscle use, wheeze, rhonchi, rales.   ABD: soft, supple, non-tender, no guarding. BS x 4, normoactive.   NEURO: AOx3, CN II-XII grossly intact without focal deficit.   MSK: Moving LUE and LLE and RLE extremities with no apparent deformities.  L knee mild TTP BL malleolus, negative lachmans, negative valgus or varus strin, nontender tibia nontender ankle  SKIN: Warm, dry, normal color, without apparent rashes. L knee not erythematous or swelling PE:   GEN: Awake, alert, interactive, NAD, non-toxic appearing.   HEAD: Atraumatic  EYES: Sclera white, conjunctiva pink, PERRLA  CARDIAC: Reg rate and rhythm, S1,S2, no murmur/rub/gallop. Strong central and peripheral pulses, Brisk cap refill, no evident pedal edema.   RESP: No distress noted. L/S clear = Bilat without accessory muscle use, wheeze, rhonchi, rales.   ABD: soft, supple, non-tender, no guarding. BS x 4, normoactive.   NEURO: AOx3, CN II-XII grossly intact without focal deficit.   MSK: Moving LUE and LLE and RLE extremities with no apparent deformities.  L knee mild TTP BL malleolus, negative lachmans, negative valgus or varus strin, nontender tibia nontender ankle. (-) Terry sign  SKIN: Warm, dry, normal color, without apparent rashes. L knee not erythematous or swelling

## 2022-09-10 NOTE — ED PROVIDER NOTE - CARE PROVIDER_API CALL
Julio Cesar Melvin)  [facility]  [facility]  [address] [place] [address], [address] [place] [address]  Phone: [phone]  Fax: [phone]  Follow Up Time: 1-3 Days

## 2022-09-10 NOTE — ED PROVIDER NOTE - PROGRESS NOTE DETAILS
Case discussed with Dr Marta Wood - pts rheumatologist- advised would like an ESR/ CRP/ rheumatoid factor and uri c acid level, may be dc before results. She is on medical leave for a while and can not see pt before her next scheduled appointment but she advises follow up with ortho. There is an ortho doctor in her office and she will put in a referral to there. Advised OK to increase mobic to 15mg daily. Case discussed with Dr Marta Wood - pts rheumatologist- advised would like an ESR/ CRP/ rheumatoid factor and uri c acid level, may be dc before results. She is on medical leave for a while and can not see pt before her next scheduled appointment but she advises follow up with ortho. There is an ortho doctor in her office and she will put in a referral to there. Advised OK to increase mobic to 15mg daily. THere is suspicion per patient's rheumatologist of possible pain seeking behavior and encouraged that along with mobic she follow up with rheum outpatient.

## 2022-09-14 ENCOUNTER — APPOINTMENT (OUTPATIENT)
Dept: ORTHOPEDIC SURGERY | Facility: CLINIC | Age: 62
End: 2022-09-14

## 2022-09-14 PROCEDURE — 20610 DRAIN/INJ JOINT/BURSA W/O US: CPT | Mod: LT

## 2022-09-14 PROCEDURE — 73564 X-RAY EXAM KNEE 4 OR MORE: CPT | Mod: LT

## 2022-09-14 PROCEDURE — 99214 OFFICE O/P EST MOD 30 MIN: CPT | Mod: 25

## 2022-09-16 NOTE — PHYSICAL EXAM
[de-identified] : Oriented to time, place, person\par Mood: Normal\par Affect: Normal\par Appearance: Healthy, well appearing, no acute distress.\par Gait: Normal\par Assistive Devices: None\par \par Left Knee Exam:\par \par Skin: Clean, dry, intact\par Inspection: No obvious malalignment, no masses, no swelling, no effusion\par Pulses: 2+ DP/PT pulses \par ROM: 0-115 degrees of flexion. No pain with deep knee flexion/extension.\par Tenderness: No MJLT. No LJLT. No pain over the patella facets. No pain to the quadriceps tendon. No pain to the patella tendon. No posterior knee tenderness.\par Stability: Stable to varus, valgus. Negative Lachman testing. Negative anterior drawer, negative posterior drawer.\par Strength: 5/5 Q/H/TA/GS/EHL, without atrophy\par Neuro: Intact to light touch throughout, DTRs normal\par Additional Tests: Negative Escobar's test, Negative patellar grind test  [de-identified] : The following radiographs were ordered and read by me during this patients visit. I reviewed each radiograph in detail with the patient and discussed the findings as highlighted below. \par \par 4 views of the left knee were obtained today, 09/14/2022, that show no acute fracture or dislocation. There is no medial, no lateral and no patellofemoral degenerative changes seen. There is no significant malalignment. There is small loose body posterior knee measuring 12 mm.

## 2022-09-16 NOTE — HISTORY OF PRESENT ILLNESS
[de-identified] : 62 year old female presents today with left knee pain x 2 months. No injury reported. The pain is constant worse with walking and stairs. She was seen in ER over the weekend x-rays and US done. X-rays was negative for fx. Mobic is not helpful. Feels that pain is getting worse. C/o buckling, pain full cracking. Denies prior left knee surgery, catching, locking. Patient is  s/p right shoulder SAD, RTCR, biceps tenotomy 4/27/2021.

## 2022-09-16 NOTE — PROCEDURE
[de-identified] : Injection: Left knee joint.\par Indication: DJD \par \par A discussion was had with the patient regarding this procedure and all questions were answered. All risks, benefits and alternatives were discussed. These included but were not limited to bleeding, infection, and allergic reaction. Alcohol was used to clean the skin, and Betadine was used to sterilize and prep the area in the superolateral aspect of the left knee. Ethyl chloride spray was then used as a topical anesthetic. A 21-gauge needle was used to inject 4cc of 1% lidocaine and 1cc of 40mg/ml methylprednisolone into the knee. A sterile bandage was then applied. The patient tolerated the procedure well and there were no complications.

## 2022-09-16 NOTE — ADDENDUM
[FreeTextEntry1] : This note was written by Luzmaria Valdez on 09/14/2022 acting solely as a scribe for Dr. Kunal Palmer.\par \par All medical record entries made by the Scribe were at my, Dr. Kunal Palmer, direction and personally dictated by me on 09/14/2022. I have personally reviewed the chart and agree that the record accurately reflects my personal performance of the history, physical exam, assessment and plan.

## 2022-09-16 NOTE — DISCUSSION/SUMMARY
[de-identified] : 63 y/o female with left knee pain. \par \par Presentation is consistent with early degenerative change and arthrosis to the knee. Described that this is likely due to overuse, and age-related "wear and tear". Pain may be related to breakdown of the chondral surfaces, cartilage, or ligaments of the knee. Injection therapy was provided for therapeutic and symptomatic relief.  No significant internal structural derangement is suspected.  Would recommend trial of conservative management.\par \par Recommendation: Conservative care & observation; including rest/activity avoidance until less symptomatic with subsequent gradual return to full low impact activity as tolerated. Patient may also use OTC NSAIDs or acetaminophen as tolerated, with application of ice/heat to the area 2-3x daily for 20 minutes after periods of activity. \par \par Follow up as needed if pain persists for further evaluation and treatment.

## 2022-09-19 ENCOUNTER — APPOINTMENT (OUTPATIENT)
Dept: ORTHOPEDIC SURGERY | Facility: CLINIC | Age: 62
End: 2022-09-19

## 2022-10-03 ENCOUNTER — RX RENEWAL (OUTPATIENT)
Age: 62
End: 2022-10-03

## 2022-10-06 ENCOUNTER — APPOINTMENT (OUTPATIENT)
Dept: MRI IMAGING | Facility: CLINIC | Age: 62
End: 2022-10-06

## 2022-10-06 PROCEDURE — 73721 MRI JNT OF LWR EXTRE W/O DYE: CPT | Mod: LT

## 2022-10-14 ENCOUNTER — APPOINTMENT (OUTPATIENT)
Dept: INTERNAL MEDICINE | Facility: CLINIC | Age: 62
End: 2022-10-14

## 2022-10-14 VITALS
OXYGEN SATURATION: 98 % | SYSTOLIC BLOOD PRESSURE: 130 MMHG | BODY MASS INDEX: 31.28 KG/M2 | RESPIRATION RATE: 14 BRPM | HEART RATE: 85 BPM | WEIGHT: 170 LBS | HEIGHT: 62 IN | DIASTOLIC BLOOD PRESSURE: 78 MMHG | TEMPERATURE: 97.2 F

## 2022-10-14 PROCEDURE — 99214 OFFICE O/P EST MOD 30 MIN: CPT

## 2022-10-14 NOTE — ASSESSMENT
[FreeTextEntry1] : Fibromyalgia- follow up with rheum\par Knee pain- follow up with ortho ( follow up with MRI of knee)\par Vasovagal- to follow up with neuro and cardiology\par Migraines- follow up with neuro\par anxiety and depression- to [facility]

## 2022-10-14 NOTE — HEALTH RISK ASSESSMENT
[No falls in past year] : Patient reported no falls in the past year [2] : 1) Little interest or pleasure doing things for more than half of the days (2) [1] : 2) Feeling down, depressed, or hopeless for several days (1) [PHQ-9 Positive] : PHQ-9 Positive [[id number]] : 3

## 2022-10-14 NOTE — REVIEW OF SYSTEMS
[Fatigue] : fatigue [Muscle Pain] : muscle pain [Back Pain] : back pain [Negative] : Neurological [Suicidal] : not suicidal [Insomnia] : no insomnia [Anxiety] : anxiety [Depression] : depression

## 2022-10-14 NOTE — HISTORY OF PRESENT ILLNESS
[de-identified] : Pt here today for follow up. \par Fainted 2 weeks ago.\par No LOC.\par Got up, patient OK.\par Pt stayed on couch and did fine. \par Pt followed by cardiology.\par Mood is low due to continued pain. \par Pt with history of fibromyalgia \par

## 2022-10-17 ENCOUNTER — NON-APPOINTMENT (OUTPATIENT)
Age: 62
End: 2022-10-17

## 2022-10-20 ENCOUNTER — APPOINTMENT (OUTPATIENT)
Dept: ORTHOPEDIC SURGERY | Facility: CLINIC | Age: 62
End: 2022-10-20

## 2022-10-20 VITALS — HEIGHT: 62 IN | BODY MASS INDEX: 31.28 KG/M2 | WEIGHT: 170 LBS

## 2022-10-20 PROCEDURE — 99214 OFFICE O/P EST MOD 30 MIN: CPT

## 2022-10-27 ENCOUNTER — TRANSCRIPTION ENCOUNTER (OUTPATIENT)
Age: 62
End: 2022-10-27

## 2022-11-08 ENCOUNTER — APPOINTMENT (OUTPATIENT)
Dept: NEUROLOGY | Facility: CLINIC | Age: 62
End: 2022-11-08

## 2022-11-08 VITALS
HEART RATE: 73 BPM | SYSTOLIC BLOOD PRESSURE: 113 MMHG | TEMPERATURE: 97.8 F | WEIGHT: 170 LBS | DIASTOLIC BLOOD PRESSURE: 75 MMHG | BODY MASS INDEX: 31.28 KG/M2 | HEIGHT: 62 IN

## 2022-11-08 DIAGNOSIS — R55 SYNCOPE AND COLLAPSE: ICD-10-CM

## 2022-11-08 PROCEDURE — 99214 OFFICE O/P EST MOD 30 MIN: CPT

## 2022-11-08 RX ORDER — SODIUM SULFATE, MAGNESIUM SULFATE, AND POTASSIUM CHLORIDE 17.75; 2.7; 2.25 G/1; G/1; G/1
1479-225-188 TABLET ORAL
Qty: 1 | Refills: 0 | Status: DISCONTINUED | COMMUNITY
Start: 2021-10-11 | End: 2022-11-08

## 2022-11-08 RX ORDER — FREMANEZUMAB-VFRM 225 MG/1.5ML
225 INJECTION SUBCUTANEOUS
Qty: 3 | Refills: 3 | Status: DISCONTINUED | COMMUNITY
Start: 2022-01-18 | End: 2022-11-08

## 2022-11-08 NOTE — REVIEW OF SYSTEMS
[Feeling Poorly] : feeling poorly [Arthralgias] : arthralgias [Joint Stiffness] : joint stiffness [Limb Pain] : limb pain [As Noted in HPI] : as noted in HPI [Confused] : confusion [Dizziness] : dizziness [Fainting] : fainting [Negative] : Heme/Lymph

## 2022-11-08 NOTE — PHYSICAL EXAM
[General Appearance - Alert] : alert [General Appearance - In No Acute Distress] : in no acute distress [Oriented To Time, Place, And Person] : oriented to person, place, and time [Impaired Insight] : insight and judgment were intact [Affect] : the affect was normal [Person] : oriented to person [Place] : oriented to place [Time] : oriented to time [Naming Objects] : no difficulty naming common objects [Reading] : reading intact [Cranial Nerves Optic (II)] : visual acuity intact bilaterally,  visual fields full to confrontation, pupils equal round and reactive to light [Cranial Nerves Oculomotor (III)] : extraocular motion intact [Cranial Nerves Trigeminal (V)] : facial sensation intact symmetrically [Cranial Nerves Facial (VII)] : face symmetrical [Cranial Nerves Vestibulocochlear (VIII)] : hearing was intact bilaterally [Cranial Nerves Accessory (XI - Cranial And Spinal)] : head turning and shoulder shrug symmetric [Cranial Nerves Hypoglossal (XII)] : there was no tongue deviation with protrusion [Motor Strength] : muscle strength was normal in all four extremities [No Muscle Atrophy] : normal bulk in all four extremities [Motor Handedness Right-Handed] : the patient is right hand dominant [Sensation Tactile Decrease] : light touch was intact [Abnormal Walk] : normal gait [Balance] : balance was intact [Past-pointing] : there was no past-pointing [Tremor] : no tremor present [Dysdiadochokinesia Bilaterally] : not present

## 2022-11-08 NOTE — HISTORY OF PRESENT ILLNESS
[FreeTextEntry1] : 62 year old woman hx of chronic migraine headaches, reports now daily, moderate to severe, feels confused at times. Takes Ubrelvy 100 mg PRN, which helps.\par Has tied maxalt, Imitrex, propranolol, even Botox didn't’t help. Has tried Aimovig which helped, but didn't’t follow up.\ana cristina Has had a busy medical year, injured her left knee, medical surgery.  Also reports 1 episode of passing out a month and a half ago, unclear cause, patient felt very dizzy lightheaded, problems on the floor.  Her by her family to the couch.  Agree feeling very tired very fatigued, confused at times feels foggy.\par No history of seizures, denies biting her tongue, or losing bowel or bladder control.\par Previous evaluations for headaches including an MRI several years ago.\par  [Chronic Headache] : chronic headache [Dizziness] : dizziness [Nausea] : nausea [Photophobia] : photophobia [Phonophobia] : phonophobia [Neck Pain] : neck pain [Scotoma] : no scotoma [Numbness] : no numbness [Tingling] : no tingling [Weakness] : no weakness [Daily] : daily [> 4 hours] : > 4 hours [worsened] : worsened [Increased] : Overall, patient's activity level has increased [Worsened] : The patient reports ~his/her~ symptoms since the last visit have worsened

## 2022-11-08 NOTE — ASSESSMENT
[FreeTextEntry1] : 62 year old woman hx of chronic migraines, getting worse, now daily. Reports one episode of syncope. No hx of seizures. ? vasovagal. Non localizing exam.\par Plan: MRI head wo\par EEG\par Restart Aimovig 225 mg, offered sample, she agrees.\par Continue Ubrelvy 100 mg po PRN, can repeat in 2 hours. Samples given.\par RTO 3 months.

## 2022-11-16 NOTE — ADDENDUM
[FreeTextEntry1] : This note was written by Luzmaria Valdez on 10/20/2022 acting solely as a scribe for Dr. Kunal Palmer.  All medical record entries made by the Scribe were at my, Dr. Kunal Palmer, direction and personally dictated by me on 10/20/2022. I have personally reviewed the chart and agree that the record accurately reflects my personal performance of the history, physical exam, assessment and plan.

## 2022-11-16 NOTE — HISTORY OF PRESENT ILLNESS
[de-identified] : 62 year old female presents today with for follow up of  left knee pain. MRI left knee dated 10/6/2022 shows evidence of a high grade full-thickness tear of the posterior root attachment medial meniscus. Chondral surfaces remain intact. Minimal arthrosis. No injury reported.  States that pain has gotten worse since last visit. C/O buckling. Ibuprofen is helpful. She is ambulating with cane.  Denies prior left knee surgery, catching, locking.

## 2022-11-16 NOTE — PHYSICAL EXAM
[de-identified] : Oriented to time, place, person\par Mood: Normal\par Affect: Normal\par Appearance: Healthy, well appearing, no acute distress.\par Gait: Normal\par Assistive Devices: None\par \par Left Knee Exam:\par \par Skin: Clean, dry, intact\par Inspection: No obvious malalignment, no masses, no swelling, no effusion\par Pulses: 2+ DP/PT pulses \par ROM: 0-115 degrees of flexion.  Posterior medial pain with deep knee flexion/extension.\par Tenderness: +MJLT. No LJLT. No pain over the patella facets. No pain to the quadriceps tendon. No pain to the patella tendon. No posterior knee tenderness.\par Stability: Stable to varus, valgus. Negative Lachman testing. Negative anterior drawer, negative posterior drawer.\par Strength: 5/5 Q/H/TA/GS/EHL, without atrophy\par Neuro: Intact to light touch throughout, DTRs normal\par Additional Tests: Negative Escobar's test, Negative patellar grind test  [de-identified] : 4 views of the left knee were obtained 09/14/2022, that show no acute fracture or dislocation. There is no medial, no lateral and no patellofemoral degenerative changes seen. There is no significant malalignment. There is small loose body posterior knee measuring 12 mm. \par \par MRI left knee dated 10/6/2022 shows evidence of a high grade full-thickness tear of the posterior root attachment medial meniscus. Chondral surfaces remain intact. Minimal arthrosis.

## 2022-11-16 NOTE — DISCUSSION/SUMMARY
[de-identified] : 61 y/o female with left knee pain. \par \par Patient presents for further evaluation of left knee pain.  Patient has obtained an MRI and presents for review.  MRI showed evidence of a high grade full-thickness tear of the posterior root attachment medial meniscus. Chondral surfaces remain intact with minimal arthrosis. We discussed conservative management vs. surgical intervention.  Chondral surfaces remain intact, there is a theoretic consideration of attempted repair of the posterior root attachment of the meniscus for symptomatic relief of symptoms.  We also discussed a trial of conservative management with nonsurgical treatment and acute inflammatory control.  Considering the patient's age and activity level, recommendation was for more conservative management without surgical repair/debridement of the meniscus.  \par \par Recommendations: Begin trial of PT, Rx given.  Medial  brace Rx given. Conservative care & observation, this includes rest/activity avoidance until less symptomatic. OTC NSAID's or acetaminophen as tolerated, with application of ice to the area 2-3x daily for 20 minutes after periods of activity. \par \par Follow-up 3 months as needed

## 2022-11-22 ENCOUNTER — APPOINTMENT (OUTPATIENT)
Dept: INTERNAL MEDICINE | Facility: CLINIC | Age: 62
End: 2022-11-22

## 2022-11-22 VITALS
RESPIRATION RATE: 14 BRPM | HEART RATE: 89 BPM | DIASTOLIC BLOOD PRESSURE: 72 MMHG | BODY MASS INDEX: 30.12 KG/M2 | OXYGEN SATURATION: 97 % | TEMPERATURE: 97.3 F | SYSTOLIC BLOOD PRESSURE: 126 MMHG | WEIGHT: 170 LBS | HEIGHT: 63 IN

## 2022-11-22 PROCEDURE — 99214 OFFICE O/P EST MOD 30 MIN: CPT

## 2022-11-22 NOTE — REVIEW OF SYSTEMS
[Fatigue] : fatigue [Nasal Discharge] : nasal discharge [Cough] : cough [Negative] : Musculoskeletal

## 2022-11-22 NOTE — ASSESSMENT
[FreeTextEntry1] : URI- supportive care. Continue pred 40mg daily and benzoate TID\par Chronic migraines- follow up with neuro- continue ubrelvy 100mg prn.\par Fibromyalgia- follow up with rheum. ( Given Celebrex)
supple

## 2022-11-22 NOTE — HISTORY OF PRESENT ILLNESS
[FreeTextEntry8] : pt here today for follow up.\par Pt with cold and congestion. \par Pt complains of sinus pressure 3 days \par Did a video visit-  started on pred 40mg and benzoate.

## 2022-11-29 ENCOUNTER — APPOINTMENT (OUTPATIENT)
Dept: RADIOLOGY | Facility: CLINIC | Age: 62
End: 2022-11-29

## 2022-11-29 PROCEDURE — 71046 X-RAY EXAM CHEST 2 VIEWS: CPT

## 2022-12-06 ENCOUNTER — APPOINTMENT (OUTPATIENT)
Dept: ORTHOPEDIC SURGERY | Facility: CLINIC | Age: 62
End: 2022-12-06

## 2022-12-06 ENCOUNTER — APPOINTMENT (OUTPATIENT)
Dept: MRI IMAGING | Facility: CLINIC | Age: 62
End: 2022-12-06

## 2022-12-06 VITALS
OXYGEN SATURATION: 98 % | TEMPERATURE: 98.3 F | DIASTOLIC BLOOD PRESSURE: 70 MMHG | SYSTOLIC BLOOD PRESSURE: 122 MMHG | BODY MASS INDEX: 31.28 KG/M2 | HEART RATE: 97 BPM | HEIGHT: 62 IN | WEIGHT: 170 LBS

## 2022-12-06 DIAGNOSIS — M25.562 PAIN IN LEFT KNEE: ICD-10-CM

## 2022-12-06 PROCEDURE — 70551 MRI BRAIN STEM W/O DYE: CPT

## 2022-12-06 PROCEDURE — 99213 OFFICE O/P EST LOW 20 MIN: CPT

## 2022-12-06 NOTE — ED ADULT NURSE NOTE - CHIEF COMPLAINT
The patient is a 62y Female complaining of chest pain. Diet, Regular:   Consistent Carbohydrate {Evening Snack} (CSTCHOSN)  DASH/TLC {Sodium & Cholesterol Restricted} (DASH) (11-30-22 @ 20:44)

## 2022-12-07 ENCOUNTER — APPOINTMENT (OUTPATIENT)
Dept: NEUROLOGY | Facility: CLINIC | Age: 62
End: 2022-12-07

## 2022-12-07 PROBLEM — M25.562 LEFT KNEE PAIN: Status: ACTIVE | Noted: 2022-09-16

## 2022-12-07 NOTE — HISTORY OF PRESENT ILLNESS
[de-identified] : 62 year old female presents today with for follow up of  left knee root medial meniscus tear. States that her pain has gotten worse since last visit. C/O swelling. MRI left knee dated 10/6/2022 shows evidence of a high grade full-thickness tear of the posterior root attachment medial meniscus. Chondral surfaces remain intact. Minimal arthrosis. No injury reported.  Overload bracing is not beneficial. C/O buckling. Ibuprofen is helpful. She is ambulating with cane.  Denies prior left knee surgery, catching, locking.

## 2022-12-07 NOTE — DISCUSSION/SUMMARY
[de-identified] : 61 y/o female with left knee pain. \par \par Patient presents for further evaluation of left knee full-thickness tear of the posterior root attachment medial meniscus.  We again discussed conservative management for root tears of the medial meniscus in her age group.  However, chondral surfaces do remain intact we discussed the theoretic consideration of attempted repair of the posterior root attachment of the meniscus for symptomatic relief of symptoms.  \par \par Despite irritation of symptoms recently, patient has some radiating symptoms throughout the lower extremity that may be related to ipsilateral lumbar discomfort.  Prior to consideration of any further intervention.  Mood consider physical therapy to improve function of the lower extremity and to treat both her lumbar spine symptoms as well as the symptoms.  Patient is agreeable to conservative management\par \par Recommendations: Continue PT, Rx given.  Continue medial  brace use.  Absolute rest.  Ice to the area 2-3x daily for 20 minutes after periods of activity. \par \par Follow-up 3 months as needed

## 2022-12-07 NOTE — PHYSICAL EXAM
[de-identified] : Oriented to time, place, person\par Mood: Normal\par Affect: Normal\par Appearance: Healthy, well appearing, no acute distress.\par Gait: Antalgic\par Assistive Devices: Brace\par \par Left Knee Exam:\par \par Skin: Clean, dry, intact\par Inspection: No obvious malalignment, no masses, no swelling, no effusion\par Pulses: 2+ DP/PT pulses \par ROM: 0-115 degrees of flexion.  Posterior medial pain with deep knee flexion/extension.\par Tenderness: +MJLT. No LJLT. No pain over the patella facets. No pain to the quadriceps tendon. No pain to the patella tendon. No posterior knee tenderness.\par Stability: Stable to varus, valgus. Negative Lachman testing. Negative anterior drawer, negative posterior drawer.\par Strength: 5/5 Q/H/TA/GS/EHL, without atrophy\par Neuro: Intact to light touch throughout, DTRs normal\par Additional Tests: +Escobar's test, Negative patellar grind test  [de-identified] : 4 views of the left knee were obtained 09/14/2022, that show no acute fracture or dislocation. There is no medial, no lateral and no patellofemoral degenerative changes seen. There is no significant malalignment. There is small loose body posterior knee measuring 12 mm. \par \par MRI left knee dated 10/6/2022 shows evidence of a high grade full-thickness tear of the posterior root attachment medial meniscus. Chondral surfaces remain intact. Minimal arthrosis.

## 2022-12-07 NOTE — ADDENDUM
[FreeTextEntry1] : This note was written by Luzmaria Valdez on 12/06/2022 acting solely as a scribe for Dr. Kunal Palmer.\par \par All medical record entries made by the Scribe were at my, Dr. Kunal Palmer, direction and personally dictated by me on 12/06/2022. I have personally reviewed the chart and agree that the record accurately reflects my personal performance of the history, physical exam, assessment and plan.

## 2023-01-14 ENCOUNTER — RX RENEWAL (OUTPATIENT)
Age: 63
End: 2023-01-14

## 2023-01-24 ENCOUNTER — APPOINTMENT (OUTPATIENT)
Dept: INTERNAL MEDICINE | Facility: CLINIC | Age: 63
End: 2023-01-24
Payer: MEDICAID

## 2023-01-24 ENCOUNTER — APPOINTMENT (OUTPATIENT)
Dept: ULTRASOUND IMAGING | Facility: CLINIC | Age: 63
End: 2023-01-24
Payer: MEDICAID

## 2023-01-24 ENCOUNTER — OUTPATIENT (OUTPATIENT)
Dept: OUTPATIENT SERVICES | Facility: HOSPITAL | Age: 63
LOS: 1 days | End: 2023-01-24
Payer: MEDICAID

## 2023-01-24 VITALS
WEIGHT: 166 LBS | OXYGEN SATURATION: 98 % | SYSTOLIC BLOOD PRESSURE: 150 MMHG | BODY MASS INDEX: 30.55 KG/M2 | RESPIRATION RATE: 14 BRPM | HEIGHT: 62 IN | DIASTOLIC BLOOD PRESSURE: 92 MMHG | HEART RATE: 83 BPM

## 2023-01-24 DIAGNOSIS — M79.669 PAIN IN UNSPECIFIED LOWER LEG: ICD-10-CM

## 2023-01-24 DIAGNOSIS — Z90.49 ACQUIRED ABSENCE OF OTHER SPECIFIED PARTS OF DIGESTIVE TRACT: Chronic | ICD-10-CM

## 2023-01-24 PROCEDURE — 99213 OFFICE O/P EST LOW 20 MIN: CPT

## 2023-01-24 PROCEDURE — 93971 EXTREMITY STUDY: CPT | Mod: 26,LT

## 2023-01-24 PROCEDURE — 93971 EXTREMITY STUDY: CPT

## 2023-01-24 NOTE — PHYSICAL EXAM
[Normal] : affect was normal and insight and judgment were intact [de-identified] : swelling of left leg

## 2023-01-24 NOTE — ASSESSMENT
[FreeTextEntry1] : Left leg swelling- most likely 2nd to knee injury- to get doppler today.\par Follow up with ortho and to get PT for evaluation and treatment

## 2023-01-24 NOTE — HISTORY OF PRESENT ILLNESS
[de-identified] : Pt here today with leg pain and swelling in past several weeks.\par Pt with meniscal tear followed by ortho- starting PT\par Pt also with back pain for several weeks- takes Motrin with relief.

## 2023-02-09 ENCOUNTER — APPOINTMENT (OUTPATIENT)
Dept: ORTHOPEDIC SURGERY | Facility: CLINIC | Age: 63
End: 2023-02-09
Payer: MEDICAID

## 2023-02-09 PROCEDURE — 99214 OFFICE O/P EST MOD 30 MIN: CPT

## 2023-02-10 NOTE — ADDENDUM
[FreeTextEntry1] : This note was written by Luzmaria Valdez on 02/09/2023 acting solely as a scribe for Dr. Kunal Palmer.\par \par All medical record entries made by the Scribe were at my, Dr. Kunal Palmer, direction and personally dictated by me on 02/09/2023. I have personally reviewed the chart and agree that the record accurately reflects my personal performance of the history, physical exam, assessment and plan.

## 2023-02-10 NOTE — DISCUSSION/SUMMARY
[de-identified] : 63 y/o female with left knee degenerative root medial meniscus tear\par \par Patient presents for re-evaluation of left knee full-thickness tear of the posterior root attachment medial meniscus.  Patient has recently had symptoms of swelling in the lower extremity with ultrasound that shows Baker's cyst as seen on MRI.  We discussed that she may have had rupture of the Baker's cyst with posterior calf discomfort with subsequent swelling of the lower extremity.  Recommend continued supportive care at this time.  \par \par We again discussed the nature of root tears of the medial meniscus.  Nonsurgical management is recommended for patients in her age group, as surgical debridement does not provide any specific long-term improvement.  I am also not recommending surgical repair as I do not believe that this is in the patient's best interest may not provide short-term relief of symptoms.  Prior visit in December was aggravated with radiating lower extremity symptoms consistent with possible lumbar involvement.  Now she has swelling of the lower extremity due to the nature of her injury.  Current mixed symptoms from knee pathology/lumbar pathology/fibromyalgia.\par \par Recommendations: Consider PT as previously outlined.  Continue  brace use.  Rest from all activity.  Ice/NSAIDs. \par \par Follow-up 3 months as needed

## 2023-02-10 NOTE — HISTORY OF PRESENT ILLNESS
[de-identified] : 62 year old female presents today with for follow up of left knee root medial meniscus tear. Patient states that a couple of weeks ago she developed swelling of the knee and lower leg and bruising in her foot. Pain has gotten worse after last visit, despite treatment with brace (which she relied on). She has not been able to do PT due to family issues. She is taking Etodelac for pain relief. PMD obtained recent Doppler which was negative for DVT. Bracing is beneficial. C/O buckling.  Reviewed PMD note 1/24/2023.

## 2023-02-10 NOTE — PHYSICAL EXAM
[de-identified] : Oriented to time, place, person\par Mood: Normal\par Affect: Normal\par Appearance: Healthy, well appearing, no acute distress.\par Gait: Antalgic\par Assistive Devices: Brace\par \par Left Knee Exam:\par \par Skin: Clean, dry, intact\par Inspection: No obvious malalignment, no masses, no swelling, no effusion\par Pulses: 2+ DP/PT pulses \par ROM: 0-115 degrees of flexion.  Posterior medial pain with deep knee flexion/extension.\par Tenderness: +MJLT. No LJLT. No pain over the patella facets. No pain to the quadriceps tendon. No pain to the patella tendon. No posterior knee tenderness.\par Stability: Stable to varus, valgus. Negative Lachman testing. Negative anterior drawer, negative posterior drawer.\par Strength: 5/5 Q/H/TA/GS/EHL, without atrophy\par Neuro: Intact to light touch throughout, DTRs normal\par Additional Tests: +Escobar's test, Negative patellar grind test  [de-identified] : 4 views of the left knee were obtained 09/14/2022, that show no acute fracture or dislocation. There is no medial, no lateral and no patellofemoral degenerative changes seen. There is no significant malalignment. There is small loose body posterior knee measuring 12 mm. \par \par MRI left knee dated 10/6/2022 shows evidence of a high grade full-thickness tear of the posterior root attachment medial meniscus. Chondral surfaces remain intact. Minimal arthrosis.\par \par Left lower extremity duplex dated 1/24/2023 shows no evidence DVT with small left popliteal cyst.

## 2023-02-16 ENCOUNTER — APPOINTMENT (OUTPATIENT)
Dept: NEUROLOGY | Facility: CLINIC | Age: 63
End: 2023-02-16
Payer: MEDICAID

## 2023-02-16 PROCEDURE — 95816 EEG AWAKE AND DROWSY: CPT

## 2023-02-27 ENCOUNTER — APPOINTMENT (OUTPATIENT)
Dept: NEUROLOGY | Facility: CLINIC | Age: 63
End: 2023-02-27
Payer: MEDICAID

## 2023-02-27 VITALS
BODY MASS INDEX: 30.36 KG/M2 | TEMPERATURE: 97.8 F | HEIGHT: 62 IN | SYSTOLIC BLOOD PRESSURE: 120 MMHG | HEART RATE: 81 BPM | WEIGHT: 165 LBS | DIASTOLIC BLOOD PRESSURE: 77 MMHG

## 2023-02-27 DIAGNOSIS — G43.711 CHRONIC MIGRAINE W/OUT AURA, INTRACTABLE, WITH STATUS MIGRAINOSUS: ICD-10-CM

## 2023-02-27 PROCEDURE — 99213 OFFICE O/P EST LOW 20 MIN: CPT

## 2023-02-27 RX ORDER — MELOXICAM 7.5 MG/1
7.5 TABLET ORAL
Qty: 30 | Refills: 0 | Status: DISCONTINUED | COMMUNITY
Start: 2022-09-07 | End: 2023-02-27

## 2023-02-27 RX ORDER — PROMETHAZINE HYDROCHLORIDE AND DEXTROMETHORPHAN HYDROBROMIDE ORAL SOLUTION 15; 6.25 MG/5ML; MG/5ML
6.25-15 SOLUTION ORAL TWICE DAILY
Qty: 75 | Refills: 0 | Status: DISCONTINUED | COMMUNITY
Start: 2022-11-23 | End: 2023-02-27

## 2023-02-28 ENCOUNTER — APPOINTMENT (OUTPATIENT)
Dept: INTERNAL MEDICINE | Facility: CLINIC | Age: 63
End: 2023-02-28
Payer: MEDICAID

## 2023-02-28 VITALS
RESPIRATION RATE: 14 BRPM | HEART RATE: 78 BPM | TEMPERATURE: 97.8 F | HEIGHT: 62 IN | WEIGHT: 165 LBS | BODY MASS INDEX: 30.36 KG/M2 | OXYGEN SATURATION: 98 % | SYSTOLIC BLOOD PRESSURE: 110 MMHG | DIASTOLIC BLOOD PRESSURE: 78 MMHG

## 2023-02-28 PROBLEM — G43.711 INTRACTABLE CHRONIC MIGRAINE WITHOUT AURA AND WITH STATUS MIGRAINOSUS: Status: ACTIVE | Noted: 2022-11-08

## 2023-02-28 PROCEDURE — 99214 OFFICE O/P EST MOD 30 MIN: CPT

## 2023-02-28 NOTE — HEALTH RISK ASSESSMENT
[1] : 2) Feeling down, depressed, or hopeless for several days (1) [PHQ-2 Positive] : PHQ-2 Positive [PHQ-9 Positive] : PHQ-9 Positive

## 2023-02-28 NOTE — ASSESSMENT
[FreeTextEntry1] : Fibro- follow up with rheum.\par Migraines- follow up with neuro\par IVAN and depression- to see [facility] for evaluation and treatment

## 2023-02-28 NOTE — REVIEW OF SYSTEMS
[Tingling] : tingling [Abnormal Sensation] : an abnormal sensation [Dizziness] : dizziness [Lightheadedness] : lightheadedness [Migraine Headache] : migraine headaches [Eye Pain] : eye pain [Fever] : no fever [Chills] : no chills [Feeling Tired] : not feeling tired [Confused or Disoriented] : no confusion [Memory Lapses or Loss] : no memory loss [Decr. Concentrating Ability] : no decrease in concentrating ability [Facial Weakness] : no facial weakness [Hand Weakness] : no hand weakness [Leg Weakness] : no leg weakness [Numbness] : no numbness [Seizures] : no convulsions [Fainting] : no fainting [Vertigo] : no vertigo [Cluster Headache] : no cluster headache [Tension Headache] : no tension-type headache [Difficulty Walking] : no difficulty walking [Sleep Disturbances] : no sleep disturbances [Anxiety] : anxiety [Depression] : no depression [Change In Personality] : no personality change [Emotional Problems] : no emotional problems [Loss Of Hearing] : no hearing loss [Heart Rate Is Fast] : the heart rate was not fast [Chest Pain] : no chest pain [Palpitations] : no palpitations [Shortness Of Breath] : no shortness of breath [Cough] : no cough [Abdominal Pain] : no abdominal pain [Vomiting] : no vomiting [Arthralgias] : no arthralgias [Joint Pain] : no joint pain [Muscle Weakness] : no muscle weakness [Swollen Glands] : no swollen glands [Swollen Glands In The Neck] : no swollen glands in the neck [de-identified] : Patient currently on Xanax 1 mg every 12 hours [FreeTextEntry3] : Eye pain is described as a increase in pressure. [FreeTextEntry9] : Patient currently on Percocet 5-325 mg every 8 hour

## 2023-02-28 NOTE — HISTORY OF PRESENT ILLNESS
[FreeTextEntry1] : 62-year-old female returns to clinic for follow-up evaluation for migraine headaches patient describes the headache as being globally located described as a sharp throbbing pain rated 6-10/10 with up to 12-hour duration with medications.  Associated symptoms include going to sleep and waking up with headaches, dyspnea, expressive aphasia eye pain dizziness described as lightheadedness floaters without scotomas not being able to function, neck stiffness and that it hurts to comb her hair.  Patient is currently on Ubrelvy 100 mg  mg Ajovy 225 mg monthly which patient denies any interruptions.  Patient's current headache has a duration of 4 weeks.  MRI brain without contrast 12/6/2022 demonstrated no acute intracranial hemorrhage or acute infarct.  Patient has tried rizatriptan and sumatriptan a Mobic and Botox in the past without palliative responses.  And currently states experiencing 's failure with monthly injections of Ajovy which she states only receiving half the dosage.

## 2023-02-28 NOTE — PHYSICAL EXAM
[General Appearance - Alert] : alert [General Appearance - In No Acute Distress] : in no acute distress [Oriented To Time, Place, And Person] : oriented to person, place, and time [Affect] : the affect was normal [Mood] : the mood was normal [Cranial Nerves Optic (II)] : visual acuity intact bilaterally,  visual fields full to confrontation, pupils equal round and reactive to light [Cranial Nerves Oculomotor (III)] : extraocular motion intact [Cranial Nerves Trigeminal (V)] : facial sensation intact symmetrically [Cranial Nerves Facial (VII)] : face symmetrical [Cranial Nerves Vestibulocochlear (VIII)] : hearing was intact bilaterally [Cranial Nerves Glossopharyngeal (IX)] : tongue and palate midline [Cranial Nerves Accessory (XI - Cranial And Spinal)] : head turning and shoulder shrug symmetric [Cranial Nerves Hypoglossal (XII)] : there was no tongue deviation with protrusion [Motor Strength] : muscle strength was normal in all four extremities [Involuntary Movements] : no involuntary movements were seen [Sensation Tactile Decrease] : light touch was intact [Sensation Vibration Decrease] : vibration was intact [Abnormal Walk] : normal gait [Balance] : balance was intact [2+] : Patella left 2+ [PERRL With Normal Accommodation] : pupils were equal in size, round, reactive to light, with normal accommodation [Extraocular Movements] : extraocular movements were intact [Full Visual Field] : full visual field [Hearing Threshold Finger Rub Not [place]] : hearing was normal [Neck Appearance] : the appearance of the neck was normal [Exaggerated Use Of Accessory Muscles For Inspiration] : no accessory muscle use [] : no rash [Skin Lesions] : no skin lesions [Romberg's Sign] : Romberg's sign was negtive [Tremor] : no tremor present

## 2023-02-28 NOTE — DATA REVIEWED
[de-identified] : MRI brain noncontrast 12/6/2022: There are T2 prolongation signal abnormalities in the periventricular subcortical white matter likely related to minimal chronic microvascular ischemic changes.  There is no acute parenchymal hemorrhage, parenchymal mass mass effect or midline shift.  There is no extra-axial fluid collection.  There is no hydrocephalus there is no acute infarct.  Minimal mucosal thickness paranasal sinuses.  Impression no acute intracranial hemorrhage or acute infarct [de-identified] : EEG 2/16/2023: Normal no elliptic form activity was seen and no clinical events or seizures were recorded.

## 2023-02-28 NOTE — PHYSICAL EXAM
[Normal] : normal gait, coordination grossly intact, no focal deficits and deep tendon reflexes were 2+ and symmetric [de-identified] : left brace noted

## 2023-02-28 NOTE — HISTORY OF PRESENT ILLNESS
[de-identified] : Followed by neuro for migraines- MRI and EEG reviewed.\par Continue leg and knee pain- followed by ortho\par Pt with low mood and anxiety

## 2023-02-28 NOTE — DISCUSSION/SUMMARY
[FreeTextEntry1] : 62-year-old female with longstanding history of migraine headaches that are intractable without aura with status migrainous.  Currently on Ubrelvy 100 mg as needed and may repeat in 2 hours for abortive measures and Ajovy to 25 mg monthly for prophylaxis.  Patient's current headache is x4 weeks.\par \par #Intractable migraine without aura with status migrainous\par \par 1.  I have reviewed MRI brain and EEG with patient\par 2.  Prednisone 20 mg 3 tablets every 24 hours x5 days\par 3.  Patient injected with Ajovy 225 mg in office today\par 4.  Start Qulipta 60 mg every 24 hours at bedtime\par 5.  Patient will call in 1 week for follow-up status with migraine headache.\par 6.  I will reach out to patient regarding trial of over-the-counter remedies\par 7.  Return to clinic in 6 weeks

## 2023-03-06 ENCOUNTER — NON-APPOINTMENT (OUTPATIENT)
Age: 63
End: 2023-03-06

## 2023-03-08 ENCOUNTER — TRANSCRIPTION ENCOUNTER (OUTPATIENT)
Age: 63
End: 2023-03-08

## 2023-03-12 ENCOUNTER — RX RENEWAL (OUTPATIENT)
Age: 63
End: 2023-03-12

## 2023-03-21 ENCOUNTER — RX RENEWAL (OUTPATIENT)
Age: 63
End: 2023-03-21

## 2023-04-02 NOTE — PHYSICAL EXAM
[Normal] : no joint swelling and grossly normal strength and tone paranoid delusions/Mood disorder/Psychotic disorder/Alcohol/Substance Use disorders

## 2023-04-06 ENCOUNTER — APPOINTMENT (OUTPATIENT)
Dept: INTERNAL MEDICINE | Facility: CLINIC | Age: 63
End: 2023-04-06
Payer: MEDICAID

## 2023-04-06 VITALS
OXYGEN SATURATION: 98 % | SYSTOLIC BLOOD PRESSURE: 130 MMHG | TEMPERATURE: 98 F | HEART RATE: 74 BPM | RESPIRATION RATE: 14 BRPM | HEIGHT: 62 IN | WEIGHT: 160 LBS | DIASTOLIC BLOOD PRESSURE: 70 MMHG | BODY MASS INDEX: 29.44 KG/M2

## 2023-04-06 PROCEDURE — 99214 OFFICE O/P EST MOD 30 MIN: CPT

## 2023-04-06 NOTE — REVIEW OF SYSTEMS
[Muscle Pain] : muscle pain [Headache] : headache [Memory Loss] : no memory loss [Suicidal] : not suicidal [Insomnia] : insomnia [Anxiety] : anxiety [Depression] : no depression [Negative] : Heme/Lymph

## 2023-04-06 NOTE — HISTORY OF PRESENT ILLNESS
[de-identified] : Pt having migraines due to difficulty teeth. \par Needs implants and followed by dentist.\par Pt here today for follow up with anxiety. \par Having difficulty with sleep and migraines. ( followed by neuro)

## 2023-04-06 NOTE — ASSESSMENT
[FreeTextEntry1] : Fibromyalgia- follow up with rheum\par MIgraines- to see neuro for evaluation\par teeth issues- to see DDS

## 2023-04-10 ENCOUNTER — APPOINTMENT (OUTPATIENT)
Dept: ORTHOPEDIC SURGERY | Facility: CLINIC | Age: 63
End: 2023-04-10

## 2023-04-12 ENCOUNTER — APPOINTMENT (OUTPATIENT)
Dept: NEUROLOGY | Facility: CLINIC | Age: 63
End: 2023-04-12

## 2023-04-17 ENCOUNTER — RX RENEWAL (OUTPATIENT)
Age: 63
End: 2023-04-17

## 2023-04-29 ENCOUNTER — TRANSCRIPTION ENCOUNTER (OUTPATIENT)
Age: 63
End: 2023-04-29

## 2023-05-08 ENCOUNTER — TRANSCRIPTION ENCOUNTER (OUTPATIENT)
Age: 63
End: 2023-05-08

## 2023-05-10 ENCOUNTER — APPOINTMENT (OUTPATIENT)
Dept: ORTHOPEDIC SURGERY | Facility: CLINIC | Age: 63
End: 2023-05-10
Payer: MEDICAID

## 2023-05-10 VITALS — WEIGHT: 165 LBS | BODY MASS INDEX: 30.36 KG/M2 | HEIGHT: 62 IN

## 2023-05-10 DIAGNOSIS — M23.204 DERANGEMENT OF UNSPECIFIED MEDIAL MENISCUS DUE TO OLD TEAR OR INJURY, LEFT KNEE: ICD-10-CM

## 2023-05-10 DIAGNOSIS — M67.912 UNSPECIFIED DISORDER OF SYNOVIUM AND TENDON, LEFT SHOULDER: ICD-10-CM

## 2023-05-10 PROCEDURE — 99213 OFFICE O/P EST LOW 20 MIN: CPT

## 2023-05-11 PROBLEM — M23.204 DEGENERATIVE TEAR OF MEDIAL MENISCUS OF LEFT KNEE: Status: ACTIVE | Noted: 2023-02-10

## 2023-05-11 PROBLEM — M67.912 TENDINOPATHY OF LEFT ROTATOR CUFF: Status: ACTIVE | Noted: 2019-03-04

## 2023-05-11 NOTE — PHYSICAL EXAM
[de-identified] : Oriented to time, place, person\par Mood: Normal\par Affect: Normal\par Appearance: Healthy, well appearing, no acute distress.\par Gait: Antalgic\par Assistive Devices: Brace\par \par Left Knee Exam:\par \par Skin: Clean, dry, intact\par Inspection: No obvious malalignment, no masses, no swelling, no effusion\par Pulses: 2+ DP/PT pulses \par ROM: 0-115 degrees of flexion.  Posterior medial pain with deep knee flexion/extension.\par Tenderness: + MJLT. No LJLT. No pain over the patella facets. No pain to the quadriceps tendon. No pain to the patella tendon. No posterior knee tenderness.\par Stability: Stable to varus, valgus. Negative Lachman testing. Negative anterior drawer, negative posterior drawer.\par Strength: 5/5 Q/H/TA/GS/EHL, without atrophy\par Neuro: Intact to light touch throughout, DTRs normal\par Additional Tests: +Escobar's test, Negative patellar grind test  [de-identified] : 4 views of the left knee were obtained 09/14/2022, that show no acute fracture or dislocation. There is no medial, no lateral and no patellofemoral degenerative changes seen. There is no significant malalignment. There is small loose body posterior knee measuring 12 mm. \par \par MRI left knee dated 10/6/2022 shows evidence of a high grade full-thickness tear of the posterior root attachment medial meniscus. Chondral surfaces remain intact. Minimal arthrosis.\par \par Left lower extremity duplex dated 1/24/2023 shows no evidence DVT with small left popliteal cyst.
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
## 2023-05-11 NOTE — ADDENDUM
[FreeTextEntry1] : I, Ramesh Sommer, acted solely as a scribe for Dr. Kunal Palmer MD on  05/10/2023.\par \par All medical record entries made by the Scribe were at my, Dr. Kunal Palmer MD, direction and personally dictated by me on 05/10/2023. I have personally reviewed the chart and agree that the record accurately reflects my personal performance of the history, physical exam, assessment and plan.

## 2023-05-11 NOTE — DISCUSSION/SUMMARY
[de-identified] : 64 y/o female with left knee degenerative root medial meniscus tear, left shoulder pain\par \par Patient presents for re-evaluation of acute exacerbation of her left knee full-thickness tear of the posterior root attachment medial meniscus. We again discussed the nature of root tears of the medial meniscus.  Nonsurgical management is recommended with continued conservative care, as surgical debridement will likely not provide any significant long-term relief.\par \par Patient also presents with left shoulder pain, with prior MRI that is suggestive of rotator cuff disease.  She is status post right shoulder rotator cuff repair with good effect.  Considering persistent pain now worse, would recommend follow-up MRI to evaluate for progression with additional evaluation after advanced imaging.\par \par Recommendations: HEP.  Continue brace use.  Activity restriction.  Ice/NSAIDs.  MRI left shoulder\par \par Follow-up after MRI

## 2023-05-23 ENCOUNTER — APPOINTMENT (OUTPATIENT)
Dept: INTERNAL MEDICINE | Facility: CLINIC | Age: 63
End: 2023-05-23
Payer: MEDICAID

## 2023-05-23 VITALS
HEART RATE: 77 BPM | WEIGHT: 168 LBS | TEMPERATURE: 98.2 F | BODY MASS INDEX: 30.91 KG/M2 | OXYGEN SATURATION: 98 % | HEIGHT: 62 IN | SYSTOLIC BLOOD PRESSURE: 114 MMHG | RESPIRATION RATE: 14 BRPM | DIASTOLIC BLOOD PRESSURE: 74 MMHG

## 2023-05-23 PROCEDURE — 99214 OFFICE O/P EST MOD 30 MIN: CPT

## 2023-05-23 NOTE — ASSESSMENT
[FreeTextEntry1] : Fibromyalgia- continue Cymbalta 60mg daily and Lyrica 75mg bid\par HTN- continue diltiazem 120mg daily\par Hyperlipidemia- continue Lipitor 10mg daily

## 2023-05-23 NOTE — HISTORY OF PRESENT ILLNESS
[de-identified] : Here today for follow up.\par Pt followed by neuro for headaches. - Dr. Barboza.\par Pt went to rheum for fibromyalgia- staying on Cymbalta and to start Lyrica.

## 2023-06-14 DIAGNOSIS — M51.26 OTHER INTERVERTEBRAL DISC DISPLACEMENT, LUMBAR REGION: ICD-10-CM

## 2023-06-19 ENCOUNTER — RX RENEWAL (OUTPATIENT)
Age: 63
End: 2023-06-19

## 2023-06-21 ENCOUNTER — RX RENEWAL (OUTPATIENT)
Age: 63
End: 2023-06-21

## 2023-06-29 ENCOUNTER — RX RENEWAL (OUTPATIENT)
Age: 63
End: 2023-06-29

## 2023-06-29 DIAGNOSIS — E55.9 VITAMIN D DEFICIENCY, UNSPECIFIED: ICD-10-CM

## 2023-08-05 ENCOUNTER — APPOINTMENT (OUTPATIENT)
Dept: INTERNAL MEDICINE | Facility: CLINIC | Age: 63
End: 2023-08-05
Payer: MEDICAID

## 2023-08-05 VITALS
HEIGHT: 62 IN | RESPIRATION RATE: 14 BRPM | HEART RATE: 76 BPM | WEIGHT: 170 LBS | SYSTOLIC BLOOD PRESSURE: 120 MMHG | OXYGEN SATURATION: 99 % | DIASTOLIC BLOOD PRESSURE: 74 MMHG | BODY MASS INDEX: 31.28 KG/M2

## 2023-08-05 DIAGNOSIS — R07.81 PLEURODYNIA: ICD-10-CM

## 2023-08-05 DIAGNOSIS — M79.7 FIBROMYALGIA: ICD-10-CM

## 2023-08-05 PROCEDURE — 99214 OFFICE O/P EST MOD 30 MIN: CPT

## 2023-08-05 NOTE — ASSESSMENT
[FreeTextEntry1] : Fibromyalgia- Starting on gabapentin 100mg bid by rheum. Still with pain- may need titration.  Migraines- Aimovig weekly started . Continue ajovy weekly. Ubrelvy prn Scraps from fall- healing IVAN xanax prn. Pt to taking sparingly

## 2023-08-05 NOTE — PHYSICAL EXAM
[Normal] : no posterior cervical lymphadenopathy and no anterior cervical lymphadenopathy [de-identified] : superficial healing abrasions

## 2023-08-05 NOTE — HISTORY OF PRESENT ILLNESS
[de-identified] : Pt here today for follow up. Pt walking last week and fell on sidewalk. Complains of rib pain from fall scrapped knees and doing better. Healing scraps  History of headaches- followed by neuro. Started on Aimovig monlyth

## 2023-08-05 NOTE — REVIEW OF SYSTEMS
[Muscle Pain] : muscle pain [Back Pain] : back pain [Headache] : headache [Insomnia] : insomnia [Anxiety] : anxiety [Negative] : Heme/Lymph [FreeTextEntry9] : rib pain from fall

## 2023-08-08 NOTE — ED CDU PROVIDER DISPOSITION NOTE - WET READ LAUNCH FT
There are no Wet Read(s) to document. Acitretin Counseling:  I discussed with the patient the risks of acitretin including but not limited to hair loss, dry lips/skin/eyes, liver damage, hyperlipidemia, depression/suicidal ideation, photosensitivity.  Serious rare side effects can include but are not limited to pancreatitis, pseudotumor cerebri, bony changes, clot formation/stroke/heart attack.  Patient understands that alcohol is contraindicated since it can result in liver toxicity and significantly prolong the elimination of the drug by many years.

## 2023-08-10 ENCOUNTER — APPOINTMENT (OUTPATIENT)
Dept: RADIOLOGY | Facility: CLINIC | Age: 63
End: 2023-08-10
Payer: MEDICAID

## 2023-08-10 PROCEDURE — 71100 X-RAY EXAM RIBS UNI 2 VIEWS: CPT | Mod: LT

## 2023-08-28 ENCOUNTER — APPOINTMENT (OUTPATIENT)
Dept: NEUROLOGY | Facility: CLINIC | Age: 63
End: 2023-08-28
Payer: MEDICAID

## 2023-08-28 VITALS
WEIGHT: 170 LBS | HEIGHT: 62 IN | SYSTOLIC BLOOD PRESSURE: 122 MMHG | DIASTOLIC BLOOD PRESSURE: 83 MMHG | HEART RATE: 67 BPM | BODY MASS INDEX: 31.28 KG/M2 | TEMPERATURE: 98.2 F

## 2023-08-28 PROCEDURE — 99214 OFFICE O/P EST MOD 30 MIN: CPT

## 2023-08-28 RX ORDER — FREMANEZUMAB-VFRM 225 MG/1.5ML
225 INJECTION SUBCUTANEOUS
Qty: 3 | Refills: 3 | Status: DISCONTINUED | COMMUNITY
Start: 2023-02-27 | End: 2023-08-28

## 2023-08-28 RX ORDER — FREMANEZUMAB-VFRM 225 MG/1.5ML
225 INJECTION SUBCUTANEOUS
Qty: 3 | Refills: 3 | Status: DISCONTINUED | COMMUNITY
Start: 2022-11-08 | End: 2023-08-28

## 2023-08-28 RX ORDER — SUMATRIPTAN SUCCINATE 6 MG/.5ML
6 INJECTION, SOLUTION SUBCUTANEOUS
Qty: 12 | Refills: 0 | Status: DISCONTINUED | COMMUNITY
Start: 2023-03-06 | End: 2023-08-28

## 2023-08-28 NOTE — PHYSICAL EXAM
[General Appearance - Alert] : alert [General Appearance - In No Acute Distress] : in no acute distress [Oriented To Time, Place, And Person] : oriented to person, place, and time [Impaired Insight] : insight and judgment were intact [Affect] : the affect was normal [Person] : oriented to person [Place] : oriented to place [Time] : oriented to time [Cranial Nerves Optic (II)] : visual acuity intact bilaterally,  visual fields full to confrontation, pupils equal round and reactive to light [Cranial Nerves Oculomotor (III)] : extraocular motion intact [Cranial Nerves Trigeminal (V)] : facial sensation intact symmetrically [Cranial Nerves Facial (VII)] : face symmetrical [Cranial Nerves Vestibulocochlear (VIII)] : hearing was intact bilaterally [Cranial Nerves Glossopharyngeal (IX)] : tongue and palate midline [Cranial Nerves Accessory (XI - Cranial And Spinal)] : head turning and shoulder shrug symmetric [Cranial Nerves Hypoglossal (XII)] : there was no tongue deviation with protrusion [Motor Strength] : muscle strength was normal in all four extremities [No Muscle Atrophy] : normal bulk in all four extremities [Motor Handedness Right-Handed] : the patient is right hand dominant [Paresis Pronator Drift Right-Sided] : no pronator drift on the right [Paresis Pronator Drift Left-Sided] : no pronator drift on the left [Sensation Tactile Decrease] : light touch was intact [Past-pointing] : there was no past-pointing [Tremor] : no tremor present [Dysdiadochokinesia Bilaterally] : not present [Coordination - Dysmetria Impaired Finger-to-Nose Bilateral] : not present [1+] : Ankle jerk right 1+ [2+] : Ankle jerk left 2+ [FreeTextEntry8] : Antalgic gait, using a cane on the right

## 2023-08-28 NOTE — REVIEW OF SYSTEMS
[Feeling Poorly] : feeling poorly [Feeling Tired] : feeling tired [Neck Pain] : neck pain [Joint Stiffness] : joint stiffness [Lower Back Pain] : lower back pain [Limb Pain] : limb pain [Limb Weakness] : limb weakness [Sleep Disturbances] : sleep disturbances [Anxiety] : anxiety [Negative] : Heme/Lymph

## 2023-08-28 NOTE — ASSESSMENT
[FreeTextEntry1] : 63-year-old woman history of hypertension, hypothyroidism, fibromyalgia, chronic migraines reports episodes of falling, no associated dizziness.  Questionable near syncope.Falls likely multifactorial. Recommended continuation with Aimovig 140 mg subcu has an appointment for follow-up in December. A prescription for Qulipta was also sent to the pharmacy, advised patient to let me know if it is not covered or the need, or preauthorization. Patient is asking for a letter as she apparently has been told by her dentist that she has a asymmetric chewing, due to lack of teeth in the back of the lower jaw and might improve with implants.  She thinks it may also be associated with some of her headaches.

## 2023-08-28 NOTE — HISTORY OF PRESENT ILLNESS
[FreeTextEntry1] : 63-year-old woman history of hypertension, hypothyroidism, fibromyalgia, migraine headaches here for follow-up visit.  Reports has had several episodes of falls during the summer, last 1 being 3 weeks ago, fell while walking on the street, unsure what happened.  Denies any preceding dizziness, no loss consciousness, did not strike her head.  Landed face forward injuring her rib cage, her arms and shoulders and knee. Reports 2 episodes at home, usually in the kitchen, with sudden onset feeling hot sweaty, near passing out episodes, and associated with headaches. Evaluations of including an MRI of the brain, from last year was unremarkable.  An EEG in February of this year was normal. Also followed by orthopedics, cardiology, rheumatology. Reports having a lot of problem with the knees, left more than right. Going to physical therapy to work on her neck or lower back, arms and shoulders as well as balance. History of chronic migraines, reports almost daily headaches recently started Aimovig 140 mg subcu monthly, reports so far no significant change.  In the past tried Ajovy.  Has tried other preventative therapies in the past, topiramate, nortriptyline, in the past Botox. Did try samples of Qulipta 60 mg, but apparently not given to her by her insurance. [Chronic Headache] : chronic headache [Dizziness] : no dizziness [Nausea] : no nausea [Vomiting] : no Vomiting [Photophobia] : no photophobia [Phonophobia] : no phonophobia [Neck Pain] : neck pain [Scotoma] : no scotoma [Numbness] : no numbness [Tingling] : no tingling [Weakness] : no weakness [Scalp Tenderness] : no scalp tenderness [> 15 days per month] : > 15 days per month [> 4 hours] : > 4 hours [stayed the same] : stayed the same [Stable] : The patient reports ~his/her~ symptoms since the last visit are stable

## 2023-08-28 NOTE — DATA REVIEWED
[FreeTextEntry1] : 	 EXAM: [id number] - MR BRAIN  - ORDERED BY: FIONA HILL   PROCEDURE DATE:  12/06/2022    INTERPRETATION:  CLINICAL STATEMENT: Worsening headache  TECHNIQUE: MRI of the brain was performed without gadolinium.  COMPARISON: None.  FINDINGS: . There are T2 prolongation signal abnormalities in the periventricular subcortical white matter likely related to minimal chronic microvascular ischemic changes.  There is no acute parenchymal hemorrhage, parenchymal mass, mass effect or midline shift. There is no extra-axial fluid collection.  There is no hydrocephalus.  There is no acute infarct.  Minimal mucosal thickening paranasal sinuses.  IMPRESSION: No acute intracranial hemorrhage or acute infarct.  --- End of Report ---

## 2023-09-20 ENCOUNTER — APPOINTMENT (OUTPATIENT)
Dept: CARDIOLOGY | Facility: CLINIC | Age: 63
End: 2023-09-20
Payer: MEDICAID

## 2023-09-20 VITALS
BODY MASS INDEX: 32.02 KG/M2 | HEIGHT: 62 IN | HEART RATE: 75 BPM | DIASTOLIC BLOOD PRESSURE: 72 MMHG | WEIGHT: 174 LBS | OXYGEN SATURATION: 99 % | SYSTOLIC BLOOD PRESSURE: 117 MMHG

## 2023-09-20 DIAGNOSIS — R07.89 OTHER CHEST PAIN: ICD-10-CM

## 2023-09-20 PROCEDURE — 93000 ELECTROCARDIOGRAM COMPLETE: CPT

## 2023-09-20 PROCEDURE — 99204 OFFICE O/P NEW MOD 45 MIN: CPT | Mod: 25

## 2023-09-23 ENCOUNTER — RX RENEWAL (OUTPATIENT)
Age: 63
End: 2023-09-23

## 2023-10-03 ENCOUNTER — APPOINTMENT (OUTPATIENT)
Dept: CARDIOLOGY | Facility: CLINIC | Age: 63
End: 2023-10-03

## 2023-10-10 ENCOUNTER — APPOINTMENT (OUTPATIENT)
Dept: CARDIOLOGY | Facility: CLINIC | Age: 63
End: 2023-10-10

## 2023-10-18 ENCOUNTER — APPOINTMENT (OUTPATIENT)
Dept: INTERNAL MEDICINE | Facility: CLINIC | Age: 63
End: 2023-10-18
Payer: MEDICAID

## 2023-10-18 VITALS
SYSTOLIC BLOOD PRESSURE: 124 MMHG | WEIGHT: 173 LBS | HEART RATE: 66 BPM | BODY MASS INDEX: 31.83 KG/M2 | RESPIRATION RATE: 14 BRPM | TEMPERATURE: 98.7 F | OXYGEN SATURATION: 98 % | HEIGHT: 62 IN | DIASTOLIC BLOOD PRESSURE: 80 MMHG

## 2023-10-18 DIAGNOSIS — J06.9 ACUTE UPPER RESPIRATORY INFECTION, UNSPECIFIED: ICD-10-CM

## 2023-10-18 LAB
FLUAV SPEC QL CULT: NORMAL
FLUBV AG SPEC QL IA: NORMAL
SARS-COV-2 AG RESP QL IA.RAPID: NEGATIVE

## 2023-10-18 PROCEDURE — 99214 OFFICE O/P EST MOD 30 MIN: CPT | Mod: 25

## 2023-10-18 PROCEDURE — 87811 SARS-COV-2 COVID19 W/OPTIC: CPT | Mod: QW

## 2023-10-18 PROCEDURE — 87804 INFLUENZA ASSAY W/OPTIC: CPT | Mod: QW

## 2023-10-31 DIAGNOSIS — J01.80 OTHER ACUTE SINUSITIS: ICD-10-CM

## 2023-11-01 ENCOUNTER — NON-APPOINTMENT (OUTPATIENT)
Age: 63
End: 2023-11-01

## 2023-11-03 ENCOUNTER — APPOINTMENT (OUTPATIENT)
Dept: CARDIOLOGY | Facility: CLINIC | Age: 63
End: 2023-11-03

## 2023-11-14 ENCOUNTER — APPOINTMENT (OUTPATIENT)
Dept: INTERNAL MEDICINE | Facility: CLINIC | Age: 63
End: 2023-11-14
Payer: MEDICAID

## 2023-11-14 VITALS
HEIGHT: 62 IN | WEIGHT: 170 LBS | TEMPERATURE: 97.7 F | SYSTOLIC BLOOD PRESSURE: 92 MMHG | HEART RATE: 72 BPM | BODY MASS INDEX: 31.28 KG/M2 | OXYGEN SATURATION: 98 % | RESPIRATION RATE: 14 BRPM | DIASTOLIC BLOOD PRESSURE: 60 MMHG

## 2023-11-14 DIAGNOSIS — J18.9 PNEUMONIA, UNSPECIFIED ORGANISM: ICD-10-CM

## 2023-11-14 DIAGNOSIS — B35.9 DERMATOPHYTOSIS, UNSPECIFIED: ICD-10-CM

## 2023-11-14 PROCEDURE — 99214 OFFICE O/P EST MOD 30 MIN: CPT

## 2023-11-14 RX ORDER — KETOCONAZOLE 20 MG/G
2 CREAM TOPICAL TWICE DAILY
Qty: 1 | Refills: 3 | Status: ACTIVE | COMMUNITY
Start: 2023-11-14 | End: 1900-01-01

## 2023-11-15 ENCOUNTER — RX RENEWAL (OUTPATIENT)
Age: 63
End: 2023-11-15

## 2023-11-17 ENCOUNTER — APPOINTMENT (OUTPATIENT)
Dept: PULMONOLOGY | Facility: CLINIC | Age: 63
End: 2023-11-17
Payer: MEDICAID

## 2023-11-17 DIAGNOSIS — J45.909 UNSPECIFIED ASTHMA, UNCOMPLICATED: ICD-10-CM

## 2023-11-17 PROCEDURE — 94729 DIFFUSING CAPACITY: CPT

## 2023-11-17 PROCEDURE — 71046 X-RAY EXAM CHEST 2 VIEWS: CPT

## 2023-11-17 PROCEDURE — 94060 EVALUATION OF WHEEZING: CPT

## 2023-11-17 PROCEDURE — 99204 OFFICE O/P NEW MOD 45 MIN: CPT | Mod: 25

## 2023-11-17 PROCEDURE — ZZZZZ: CPT

## 2023-11-17 PROCEDURE — 88738 HGB QUANT TRANSCUTANEOUS: CPT

## 2023-11-17 PROCEDURE — 94727 GAS DIL/WSHOT DETER LNG VOL: CPT

## 2023-11-19 PROBLEM — J45.909 ACUTE ASTHMATIC BRONCHITIS: Status: ACTIVE | Noted: 2023-11-19

## 2023-11-19 LAB — POCT - HEMOGLOBIN (HGB), QUANTITATIVE, TRANSCUTANEOUS: 14.5

## 2023-11-27 ENCOUNTER — APPOINTMENT (OUTPATIENT)
Dept: PULMONOLOGY | Facility: CLINIC | Age: 63
End: 2023-11-27

## 2023-11-27 ENCOUNTER — RX RENEWAL (OUTPATIENT)
Age: 63
End: 2023-11-27

## 2023-11-30 NOTE — H&P PST ADULT - NSCAFFEINETYPE_GEN_ALL_CORE_SD
Medicare Wellness Visit  Plan for Preventive Care    A good way for you to stay healthy is to use preventive care.  Medicare covers many services that can help you stay healthy.* The goal of these services is to find any health problems as quickly as possible. Finding problems early can help make them easier to treat.  Your personal plan below lists the services you may need and when they are due.      Health Maintenance Summary     Depression Screening (Yearly)  Next due on 11/26/2024    DTaP/Tdap/Td Vaccine (3 - Td or Tdap)  Next due on 8/21/2028    Shingles Vaccine   Completed    Pneumococcal Vaccine 65+   Completed    Osteoporosis Screening   Completed    Influenza Vaccine   Completed    COVID-19 Vaccine   Completed    Medicare Advantage- Medicare Wellness Visit   Completed    Meningococcal Vaccine   Aged Out    Hepatitis B Vaccine (For Physician/APC Discussion)   Aged Out    HPV Vaccine   Aged Out           Preventive Care for Women and Men    Heart Screenings (Cardiovascular):  · Blood tests are used to check your cholesterol, lipid and triglyceride levels. High levels can increase your risk for heart disease and stroke. High levels can be treated with medications, diet and exercise. Lowering your levels can help keep your heart and blood vessels healthy.  Your provider will order these tests if they are needed.    · An ultrasound is done to see if you have an abdominal aortic aneurysm (AAA).  This is an enlargement of one of the main blood vessels that delivers blood to the body.   In the [place], 9,000 deaths are caused by AAA.  You may not even know you have this problem and as many as 1 in 3 people will have a serious problem if it is not treated.  Early diagnosis allows for more effective treatment and cure.  If you have a family history of AAA or are a male age 65-75 who has smoked, you are at higher risk of an AAA.  Your provider can order this test, if needed.    Colorectal Screening:  · There  are many tests that are used to check for cancer of your colon and rectum. You and your provider should discuss what test is best for you and when to have it done.  Options include:  · Screening Colonoscopy: exam of the entire colon, seen through a flexible lighted tube.  · Flexible Sigmoidoscopy: exam of the last third (sigmoid portion) of the colon and rectum, seen through a flexible lighted tube.  · Cologuard DNA stool test: a sample of your stool is used to screen for cancer and unseen blood in your stool.  · Fecal Occult Blood Test: a sample of your stool is studied to find any unseen blood    Flu Shot:  · An immunization that helps to prevent influenza (the flu). You should get this every year. The best time to get the shot is in the fall.    Pneumococcal Shot:  • Vaccines help prevent pneumococcal disease, which is any type of illness caused by Streptococcus pneumoniae bacteria. There are two kinds of pneumococcal vaccines available in the [place]:   o Pneumococcal conjugate vaccines (PCV20 or Sivwser91®)  o Pneumococcal polysaccharide vaccine (PPSV23 or Ulbvwtteo06®)  · For those who have never received any pneumococcal conjugate vaccine, CDC recommends PVC20 for adults 65 years or older and adults 19 through 64 years old with certain medical conditions or risk factors.   · For those who have previously received PCV13, this should be followed by a dose of PPSV23.     Hepatitis B Shot:  · An immunization that helps to protect people from getting Hepatitis B. Hepatitis B is a virus that spreads through contact with infected blood or body fluids. Many people with the virus do not have symptoms.  The virus can lead to serious problems, such as liver disease. Some people are at higher risk than others. Your doctor will tell you if you need this shot.     Diabetes Screening:  · A test to measure sugar (glucose) in your blood is called a fasting blood sugar. Fasting means you cannot have food or drink for at  least 8 hours before the test. This test can detect diabetes long before you may notice symptoms.    Glaucoma Screening:  · Glaucoma screening is performed by your eye doctor. The test measures the fluid pressure inside your eyes to determine if you have glaucoma.     Hepatitis C Screening:  · A blood test to see if you have the hepatitis C virus.  Hepatitis C attacks the liver and is a major cause of chronic liver disease.  Medicare will cover a single screening for all adults born between 1945 & 1965, or high risk patients (people who have injected illegal drugs or people who have had blood transfusions).  High risk patients who continue to inject illegal drugs can be screened for Hepatitis C every year.    Smoking and Tobacco-Use Cessation Counseling:  · Tobacco is the single greatest cause of disease and early death in our country today. Medication and counseling together can increase a person’s chance of quitting for good.   · Medicare covers two quitting attempts per year, with four counseling sessions per attempt (eight sessions in a 12 month period)    Preventive Screening tests for Women    Screening Mammograms and Breast Exams:  · An x-ray of your breasts to check for breast cancer before you or your doctor may be able to feel it.  If breast cancer is found early it can usually be treated with success.    Pelvic Exams and Pap Tests:  · An exam to check for cervical and vaginal cancer. A Pap test is a lab test in which cells are taken from your cervix and sent to the lab to look for signs of cervical cancer. If cancer of the cervix is found early, chances for a cure are good. Testing can generally end at age 65, or if a woman has a hysterectomy for a benign condition. Your provider may recommend more frequent testing if certain abnormal results are found.    Bone Mass Measurements:  · A painless x-ray of your bone density to see if you are at risk for a broken bone. Bone density refers to the thickness of  bones or how tightly the bone tissue is packed.    Preventive Screening tests for Men    Prostate Screening:  · Should you have a prostate cancer test (PSA)?  It is up to you to decide if you want a prostate cancer test. Talk to your clinician to find out if the test is right for you.  Things for you to consider and talk about should include:  · Benefits and harms of the test  · Your family history  · How your race/ethnicity may influence the test  · If the test may impact other medical conditions you have  · Your values on screenings and treatments    *Medicare pays for many preventive services to keep you healthy. For some of these services, you might have to pay a deductible, coinsurance, and / or copayment.  The amounts vary depending on the type of services you need and the kind of Medicare health plan you have.    For further details on screenings offered by Medicare please visit: https://www.medicare.gov/coverage/preventive-screening-services    coffee

## 2023-12-01 ENCOUNTER — APPOINTMENT (OUTPATIENT)
Dept: NEUROLOGY | Facility: CLINIC | Age: 63
End: 2023-12-01
Payer: MEDICAID

## 2023-12-01 PROCEDURE — 99213 OFFICE O/P EST LOW 20 MIN: CPT | Mod: 95

## 2023-12-01 RX ORDER — ERENUMAB-AOOE 140 MG/ML
140 INJECTION, SOLUTION SUBCUTANEOUS
Qty: 1 | Refills: 2 | Status: DISCONTINUED | COMMUNITY
Start: 2023-08-03 | End: 2023-12-01

## 2023-12-05 ENCOUNTER — APPOINTMENT (OUTPATIENT)
Dept: CARDIOLOGY | Facility: CLINIC | Age: 63
End: 2023-12-05
Payer: MEDICAID

## 2023-12-05 PROCEDURE — 93000 ELECTROCARDIOGRAM COMPLETE: CPT

## 2023-12-05 PROCEDURE — 93306 TTE W/DOPPLER COMPLETE: CPT

## 2023-12-07 ENCOUNTER — RESULT CHARGE (OUTPATIENT)
Age: 63
End: 2023-12-07

## 2023-12-08 ENCOUNTER — APPOINTMENT (OUTPATIENT)
Dept: PULMONOLOGY | Facility: CLINIC | Age: 63
End: 2023-12-08
Payer: MEDICAID

## 2023-12-08 VITALS
OXYGEN SATURATION: 96 % | SYSTOLIC BLOOD PRESSURE: 120 MMHG | RESPIRATION RATE: 16 BRPM | DIASTOLIC BLOOD PRESSURE: 80 MMHG | HEART RATE: 72 BPM

## 2023-12-08 DIAGNOSIS — R06.00 DYSPNEA, UNSPECIFIED: ICD-10-CM

## 2023-12-08 DIAGNOSIS — J45.901 UNSPECIFIED ASTHMA WITH (ACUTE) EXACERBATION: ICD-10-CM

## 2023-12-08 PROCEDURE — 95012 NITRIC OXIDE EXP GAS DETER: CPT

## 2023-12-08 PROCEDURE — 99214 OFFICE O/P EST MOD 30 MIN: CPT | Mod: 25

## 2023-12-08 PROCEDURE — 94060 EVALUATION OF WHEEZING: CPT

## 2023-12-08 RX ORDER — FLUTICASONE PROPIONATE AND SALMETEROL XINAFOATE 230; 21 UG/1; UG/1
230-21 AEROSOL, METERED RESPIRATORY (INHALATION)
Qty: 1 | Refills: 3 | Status: ACTIVE | COMMUNITY
Start: 2023-12-08 | End: 1900-01-01

## 2023-12-08 RX ORDER — ALBUTEROL SULFATE 90 UG/1
108 (90 BASE) INHALANT RESPIRATORY (INHALATION)
Qty: 1 | Refills: 5 | Status: ACTIVE | COMMUNITY
Start: 2023-12-08 | End: 1900-01-01

## 2023-12-10 PROBLEM — R06.00 DYSPNEA: Status: ACTIVE | Noted: 2023-09-20

## 2023-12-10 PROBLEM — J45.901 ASTHMA EXACERBATION: Status: ACTIVE | Noted: 2023-12-10

## 2023-12-12 ENCOUNTER — NON-APPOINTMENT (OUTPATIENT)
Age: 63
End: 2023-12-12

## 2023-12-15 ENCOUNTER — APPOINTMENT (OUTPATIENT)
Dept: CARDIOLOGY | Facility: CLINIC | Age: 63
End: 2023-12-15
Payer: MEDICAID

## 2023-12-15 PROCEDURE — 78452 HT MUSCLE IMAGE SPECT MULT: CPT

## 2023-12-15 PROCEDURE — 93015 CV STRESS TEST SUPVJ I&R: CPT

## 2023-12-15 PROCEDURE — A9500: CPT

## 2024-01-11 NOTE — ED ADULT NURSE NOTE - CINV DISCH MEDS REVIEWED YN
Please call patient back with results.  The labs has resulted as overall improving in terms of kidney function but the potassium level is high so I actually would stop any potassium supplementation.  Please let us know if you have further questions.     Thank you   No

## 2024-01-12 ENCOUNTER — APPOINTMENT (OUTPATIENT)
Dept: INTERNAL MEDICINE | Facility: CLINIC | Age: 64
End: 2024-01-12
Payer: COMMERCIAL

## 2024-01-12 VITALS
OXYGEN SATURATION: 97 % | WEIGHT: 179 LBS | SYSTOLIC BLOOD PRESSURE: 118 MMHG | DIASTOLIC BLOOD PRESSURE: 74 MMHG | TEMPERATURE: 98.2 F | HEART RATE: 78 BPM | RESPIRATION RATE: 16 BRPM | BODY MASS INDEX: 32.94 KG/M2 | HEIGHT: 62 IN

## 2024-01-12 DIAGNOSIS — Z00.00 ENCOUNTER FOR GENERAL ADULT MEDICAL EXAMINATION W/OUT ABNORMAL FINDINGS: ICD-10-CM

## 2024-01-12 DIAGNOSIS — R92.30 DENSE BREASTS, UNSPECIFIED: ICD-10-CM

## 2024-01-12 DIAGNOSIS — F41.1 GENERALIZED ANXIETY DISORDER: ICD-10-CM

## 2024-01-12 PROCEDURE — 99396 PREV VISIT EST AGE 40-64: CPT

## 2024-01-12 RX ORDER — BENZONATATE 200 MG/1
200 CAPSULE ORAL
Qty: 30 | Refills: 0 | Status: DISCONTINUED | COMMUNITY
Start: 2023-11-14 | End: 2024-01-12

## 2024-01-12 RX ORDER — AMOXICILLIN AND CLAVULANATE POTASSIUM 875; 125 MG/1; MG/1
875-125 TABLET, COATED ORAL
Qty: 20 | Refills: 0 | Status: DISCONTINUED | COMMUNITY
Start: 2023-10-31 | End: 2024-01-12

## 2024-01-12 RX ORDER — PREDNISONE 10 MG/1
10 TABLET ORAL
Qty: 18 | Refills: 0 | Status: DISCONTINUED | COMMUNITY
Start: 2023-11-17 | End: 2024-01-12

## 2024-01-12 NOTE — ASSESSMENT
[FreeTextEntry1] : HCM- has appt for repeat COLON. Needs mammogram and sono check labs Eye md utd skin follow up

## 2024-01-12 NOTE — HEALTH RISK ASSESSMENT
[Good] : ~his/her~  mood as  good [No falls in past year] : Patient reported no falls in the past year [0] : 2) Feeling down, depressed, or hopeless: Not at all (0) [[id number]] : 0

## 2024-01-12 NOTE — HISTORY OF PRESENT ILLNESS
[de-identified] : Here today for CPE. Pt recovering from PNA. and asthma- On Advair bid and completed Medrol.  Jihan to see cardiology - for atypical CP Followed by  neuro- for migraines. Followed by rheumatology- diag for fibromyalgia- on Lyrica, Cymbalta and Diclofenac.

## 2024-01-25 ENCOUNTER — APPOINTMENT (OUTPATIENT)
Dept: PULMONOLOGY | Facility: CLINIC | Age: 64
End: 2024-01-25

## 2024-02-01 ENCOUNTER — APPOINTMENT (OUTPATIENT)
Dept: PULMONOLOGY | Facility: CLINIC | Age: 64
End: 2024-02-01
Payer: COMMERCIAL

## 2024-02-01 VITALS — SYSTOLIC BLOOD PRESSURE: 124 MMHG | HEART RATE: 71 BPM | DIASTOLIC BLOOD PRESSURE: 74 MMHG | OXYGEN SATURATION: 97 %

## 2024-02-01 DIAGNOSIS — R05.9 COUGH, UNSPECIFIED: ICD-10-CM

## 2024-02-01 DIAGNOSIS — Z01.811 ENCOUNTER FOR PREPROCEDURAL RESPIRATORY EXAMINATION: ICD-10-CM

## 2024-02-01 PROCEDURE — 99214 OFFICE O/P EST MOD 30 MIN: CPT | Mod: 25

## 2024-02-01 PROCEDURE — 94010 BREATHING CAPACITY TEST: CPT

## 2024-02-01 RX ORDER — PREGABALIN 150 MG/1
150 CAPSULE ORAL
Refills: 0 | Status: ACTIVE | COMMUNITY

## 2024-02-01 RX ORDER — AZITHROMYCIN 250 MG/1
250 TABLET, FILM COATED ORAL
Qty: 1 | Refills: 0 | Status: COMPLETED | COMMUNITY
Start: 2023-11-17 | End: 2024-02-01

## 2024-02-01 RX ORDER — METHYLPREDNISOLONE 4 MG/1
4 TABLET ORAL
Qty: 1 | Refills: 0 | Status: COMPLETED | COMMUNITY
Start: 2023-12-08 | End: 2024-02-01

## 2024-02-01 RX ORDER — UBROGEPANT 100 MG/1
100 TABLET ORAL
Qty: 16 | Refills: 3 | Status: COMPLETED | COMMUNITY
Start: 2023-02-27 | End: 2024-02-01

## 2024-02-01 RX ORDER — PREDNISONE 20 MG/1
20 TABLET ORAL DAILY
Qty: 15 | Refills: 0 | Status: COMPLETED | COMMUNITY
Start: 2023-02-27 | End: 2024-02-01

## 2024-02-02 PROBLEM — Z01.811 ENCOUNTER FOR PREOPERATIVE PULMONARY EXAMINATION: Status: ACTIVE | Noted: 2024-02-02

## 2024-02-02 PROBLEM — R05.9 COUGH: Status: ACTIVE | Noted: 2022-11-23

## 2024-02-02 NOTE — ASSESSMENT
[FreeTextEntry1] : Asthma is under control and stable. Reduce Advair HFA to 1 puff twice daily at this point. No acute or active pulmonary presentations to the proposed EGD and colonoscopy. Return for pulmonary follow-up in 3 months.

## 2024-02-02 NOTE — CONSULT LETTER
[Dear  ___] : Dear  [unfilled], [Courtesy Letter:] : I had the pleasure of seeing your patient, [unfilled], in my office today. [Please see my note below.] : Please see my note below. [Consult Closing:] : Thank you very much for allowing me to participate in the care of this patient.  If you have any questions, please do not hesitate to contact me. [Sincerely,] : Sincerely, [FreeTextEntry3] : Dr. Pricilla Lisa

## 2024-02-02 NOTE — DISCUSSION/SUMMARY
[FreeTextEntry1] : Cheli is a patient with history of stable asthma.  Secondly, she is a patient undergoing EGD and colonoscopy in the near future.

## 2024-02-02 NOTE — HISTORY OF PRESENT ILLNESS
[TextBox_4] : Overall feeling well; no respiratory complaints reported at this time   Requires clearance for colonoscopy and endoscopy - Dr Sasha Manuel (fax [phone])   Uses Advair and albuterol as needed

## 2024-02-22 ENCOUNTER — NON-APPOINTMENT (OUTPATIENT)
Age: 64
End: 2024-02-22

## 2024-02-27 ENCOUNTER — APPOINTMENT (OUTPATIENT)
Dept: ORTHOPEDIC SURGERY | Facility: CLINIC | Age: 64
End: 2024-02-27

## 2024-03-06 ENCOUNTER — APPOINTMENT (OUTPATIENT)
Dept: PULMONOLOGY | Facility: CLINIC | Age: 64
End: 2024-03-06

## 2024-04-18 ENCOUNTER — APPOINTMENT (OUTPATIENT)
Dept: ULTRASOUND IMAGING | Facility: CLINIC | Age: 64
End: 2024-04-18
Payer: COMMERCIAL

## 2024-04-18 ENCOUNTER — APPOINTMENT (OUTPATIENT)
Dept: MAMMOGRAPHY | Facility: CLINIC | Age: 64
End: 2024-04-18
Payer: COMMERCIAL

## 2024-04-18 ENCOUNTER — RESULT REVIEW (OUTPATIENT)
Age: 64
End: 2024-04-18

## 2024-04-18 PROCEDURE — 76641 ULTRASOUND BREAST COMPLETE: CPT | Mod: 50

## 2024-04-18 PROCEDURE — 77063 BREAST TOMOSYNTHESIS BI: CPT

## 2024-04-18 PROCEDURE — 77067 SCR MAMMO BI INCL CAD: CPT

## 2024-04-23 ENCOUNTER — APPOINTMENT (OUTPATIENT)
Dept: INTERNAL MEDICINE | Facility: CLINIC | Age: 64
End: 2024-04-23
Payer: COMMERCIAL

## 2024-04-23 VITALS
DIASTOLIC BLOOD PRESSURE: 78 MMHG | TEMPERATURE: 98.3 F | WEIGHT: 184 LBS | OXYGEN SATURATION: 98 % | HEIGHT: 62 IN | BODY MASS INDEX: 33.86 KG/M2 | SYSTOLIC BLOOD PRESSURE: 126 MMHG | HEART RATE: 76 BPM | RESPIRATION RATE: 16 BRPM

## 2024-04-23 DIAGNOSIS — J45.909 UNSPECIFIED ASTHMA, UNCOMPLICATED: ICD-10-CM

## 2024-04-23 DIAGNOSIS — E03.9 HYPOTHYROIDISM, UNSPECIFIED: ICD-10-CM

## 2024-04-23 DIAGNOSIS — K21.9 GASTRO-ESOPHAGEAL REFLUX DISEASE W/OUT ESOPHAGITIS: ICD-10-CM

## 2024-04-23 PROCEDURE — 99214 OFFICE O/P EST MOD 30 MIN: CPT

## 2024-04-23 PROCEDURE — G2211 COMPLEX E/M VISIT ADD ON: CPT

## 2024-04-23 RX ORDER — IBUPROFEN 600 MG/1
600 TABLET, FILM COATED ORAL
Qty: 90 | Refills: 2 | Status: ACTIVE | COMMUNITY
Start: 2021-05-01 | End: 1900-01-01

## 2024-04-23 NOTE — ASSESSMENT
[FreeTextEntry1] : HTN- Good control Continue Tiadylt ER 120mg daily. GERD- continue pantoprazole 40mg daily. Hyperthyroidism- continue methimazole 5mg daily - Followed by Dr. Kathy SUAREZ- Ibuprofen 600mg prn given.

## 2024-05-02 ENCOUNTER — APPOINTMENT (OUTPATIENT)
Dept: CARDIOLOGY | Facility: CLINIC | Age: 64
End: 2024-05-02

## 2024-05-11 ENCOUNTER — APPOINTMENT (OUTPATIENT)
Dept: MRI IMAGING | Facility: CLINIC | Age: 64
End: 2024-05-11
Payer: COMMERCIAL

## 2024-05-11 PROCEDURE — 72148 MRI LUMBAR SPINE W/O DYE: CPT

## 2024-05-28 ENCOUNTER — RESULT REVIEW (OUTPATIENT)
Age: 64
End: 2024-05-28

## 2024-06-13 ENCOUNTER — NON-APPOINTMENT (OUTPATIENT)
Age: 64
End: 2024-06-13

## 2024-06-13 ENCOUNTER — APPOINTMENT (OUTPATIENT)
Dept: CARDIOLOGY | Facility: CLINIC | Age: 64
End: 2024-06-13
Payer: COMMERCIAL

## 2024-06-13 VITALS
OXYGEN SATURATION: 96 % | HEART RATE: 62 BPM | DIASTOLIC BLOOD PRESSURE: 86 MMHG | WEIGHT: 182 LBS | SYSTOLIC BLOOD PRESSURE: 139 MMHG | BODY MASS INDEX: 33.49 KG/M2 | HEIGHT: 62 IN

## 2024-06-13 VITALS — DIASTOLIC BLOOD PRESSURE: 80 MMHG | SYSTOLIC BLOOD PRESSURE: 128 MMHG

## 2024-06-13 DIAGNOSIS — E78.5 HYPERLIPIDEMIA, UNSPECIFIED: ICD-10-CM

## 2024-06-13 DIAGNOSIS — I10 ESSENTIAL (PRIMARY) HYPERTENSION: ICD-10-CM

## 2024-06-13 DIAGNOSIS — I25.10 ATHEROSCLEROTIC HEART DISEASE OF NATIVE CORONARY ARTERY W/OUT ANGINA PECTORIS: ICD-10-CM

## 2024-06-13 PROCEDURE — 93000 ELECTROCARDIOGRAM COMPLETE: CPT

## 2024-06-13 PROCEDURE — 99214 OFFICE O/P EST MOD 30 MIN: CPT | Mod: 25

## 2024-06-13 RX ORDER — ASPIRIN 81 MG/1
81 TABLET ORAL DAILY
Qty: 30 | Refills: 0 | Status: ACTIVE | COMMUNITY
Start: 2024-06-13

## 2024-06-13 NOTE — REVIEW OF SYSTEMS
[Chest Discomfort] : chest discomfort [Negative] : Heme/Lymph [Dyspnea on exertion] : dyspnea during exertion [Joint Stiffness] : joint stiffness [Myalgia] : myalgia [SOB] : no shortness of breath [Lower Ext Edema] : no extremity edema [Leg Claudication] : no intermittent leg claudication [Palpitations] : no palpitations [Orthopnea] : no orthopnea [PND] : no PND [Syncope] : no syncope [FreeTextEntry5] : improved

## 2024-06-13 NOTE — PHYSICAL EXAM
[Well Developed] : well developed [Well Nourished] : well nourished [No Acute Distress] : no acute distress [Normal Conjunctiva] : normal conjunctiva [Normal Venous Pressure] : normal venous pressure [No Carotid Bruit] : no carotid bruit [Normal S1, S2] : normal S1, S2 [No Rub] : no rub [No Gallop] : no gallop [Clear Lung Fields] : clear lung fields [Good Air Entry] : good air entry [No Respiratory Distress] : no respiratory distress  [Soft] : abdomen soft [Non Tender] : non-tender [No Masses/organomegaly] : no masses/organomegaly [Normal Bowel Sounds] : normal bowel sounds [Normal Gait] : normal gait [No Edema] : no edema [No Cyanosis] : no cyanosis [No Clubbing] : no clubbing [No Varicosities] : no varicosities [No Rash] : no rash [No Skin Lesions] : no skin lesions [Moves all extremities] : moves all extremities [No Focal Deficits] : no focal deficits [Normal Speech] : normal speech [Alert and Oriented] : alert and oriented [Normal memory] : normal memory [Rhythm Regular] : regular [Normal S1] : normal S1 [Normal S2] : normal S2 [No Murmur] : no murmurs heard [No Pitting Edema] : no pitting edema present

## 2024-06-13 NOTE — DISCUSSION/SUMMARY
[EKG obtained to assist in diagnosis and management of assessed problem(s)] : EKG obtained to assist in diagnosis and management of assessed problem(s) [FreeTextEntry1] : Cheli arrives for routine follow up.  Prior visit history as noted,  in 2022, and had a coronary CTA at that time which demonstrated mild-moderate LAD disease, CA score of 1. She arrives in no acute distress. She is euvolemic on exam.  ECG illustrates sinus rhythm/PRWP, unchanged from prior.  Her blood pressure is at goal, and she will remain on diltiazem 120mg.   Echocardiogram and pharm stress testing from 12/2023 demonstrates normal LVEF and no evidence of ischemia.  In the setting of mild-mod coronary atherosclerosis, I have advised she start ASA 81mg daily for primary prevention. She will continue atorvastatin 10mg daily for goal LDL <70.  She will have updated lipid panel in the next coming weeks.  There was Shared discussion regarding Life style modifications such as; diet, exercise and weight maintenance for optimal CV outcomes.  She will follow up again in 6 months, sooner as needed

## 2024-06-13 NOTE — CARDIOLOGY SUMMARY
[de-identified] : 9/20/2023: Sinus rhythm 6/2024 sinus rhythm PRWP [de-identified] : CTA Cors 6/2022: Hospital IMPRESSION: 1. Coronary artery calcium score =  1 Agatston Unit (61st percentile for  subjects of the same age, gender, and race/ethnicity who are free of  clinical cardiovascular disease and treated diabetes).  2. Coronaries: --LM: contains luminal irregularities but is patent. --LAD: contains non-calcified plaque in the proximal segment with minimal  (less than 25%) luminal stenosis.  There is mixed plaque in the mid LAD  with moderate (approximately 50-69%) luminal stenosis.  There is mixed  plaque in another segment of the mid LAD with mild (less than 50%)  luminal stenosis.  There is mixed plaque in the distal LAD with mild  (less than 50%) luminal stenosis.  The visualized diagonal branch  contains mixed plaque at the ostium with minimal (less than 25%) luminal  stenosis. --Ramus Intermedius: small in caliber and contains luminal irregularities  but appears patent. --LCX: contains luminal irregularities but appears patent without  obstructive disease. --RCA: contains luminal irregularities but appears patent without  obstructive disease.  3. Cannot exclude a small patent foramen ovale.

## 2024-06-13 NOTE — ADDENDUM
[FreeTextEntry1] : doing well from cv standpoint mild to moderate LAD disease with normal pharm stress cont current management

## 2024-06-13 NOTE — HISTORY OF PRESENT ILLNESS
[FreeTextEntry1] : Cheli is a 64-year-old female here for initial evaluation.  She has a history of fibromyalgia and migraine, HTN.  In 2022, she was on vacation in [place], felt a pop in her chest, with difficulty catching her breath.  When she got back tonight states, she went to urgent care, and was subsequently sent to the emergency room.  There evaluation was unremarkable.  She had a coronary CTA on 6/11/2022 which demonstrated a calcium score of 1, with mixed plaque in her mid LAD with a moderate 50 to 69% stenosis. The pain was deemed to be reproducible with palpation, with suggestion of pericarditis.  She was treated with anti-inflammatories, and followed up with a cardiologist that year. She is known to have multiple falls last year, without significant prodrome.  Said to be mechanical due to spinal disease, fibromyalgia, osteoporosis.  From a chest pain perspective, she has been doing a bit better as far as pain is concerned, however, she had several bouts of PNA vs Bronchitis that required extended higher dose steroid treatments along with inhaler use.   She still finds it difficult to take a deep breath from time to time.  She has to make conscious effort and "sit up straight" to breath deep.  Overall, she is not very active, she is able to walk a long block slowly with out concerning symptoms.  She takes alot of caution due to her knee and back osteo and trying to avoid falling. She is on multiple therapies for pain managment for fibromyalgia

## 2024-06-14 RX ORDER — DILTIAZEM HYDROCHLORIDE 120 MG/1
120 CAPSULE, EXTENDED RELEASE ORAL
Qty: 90 | Refills: 1 | Status: ACTIVE | COMMUNITY
Start: 2022-07-05 | End: 1900-01-01

## 2024-07-01 ENCOUNTER — APPOINTMENT (OUTPATIENT)
Dept: NEUROLOGY | Facility: CLINIC | Age: 64
End: 2024-07-01
Payer: COMMERCIAL

## 2024-07-01 PROCEDURE — G2211 COMPLEX E/M VISIT ADD ON: CPT

## 2024-07-01 PROCEDURE — 99214 OFFICE O/P EST MOD 30 MIN: CPT

## 2024-07-01 RX ORDER — PREGABALIN 75 MG/1
75 CAPSULE ORAL
Qty: 60 | Refills: 0 | Status: ACTIVE | COMMUNITY
Start: 2024-07-01

## 2024-07-01 RX ORDER — TOPIRAMATE 50 MG/1
50 CAPSULE, EXTENDED RELEASE ORAL
Qty: 53 | Refills: 1 | Status: ACTIVE | COMMUNITY
Start: 2024-07-01 | End: 1900-01-01

## 2024-07-24 ENCOUNTER — APPOINTMENT (OUTPATIENT)
Dept: INTERNAL MEDICINE | Facility: CLINIC | Age: 64
End: 2024-07-24
Payer: COMMERCIAL

## 2024-07-24 VITALS
HEIGHT: 62 IN | TEMPERATURE: 98.2 F | DIASTOLIC BLOOD PRESSURE: 72 MMHG | OXYGEN SATURATION: 99 % | WEIGHT: 178 LBS | BODY MASS INDEX: 32.76 KG/M2 | SYSTOLIC BLOOD PRESSURE: 124 MMHG | HEART RATE: 74 BPM | RESPIRATION RATE: 16 BRPM

## 2024-07-24 DIAGNOSIS — E55.9 VITAMIN D DEFICIENCY, UNSPECIFIED: ICD-10-CM

## 2024-07-24 DIAGNOSIS — E03.9 HYPOTHYROIDISM, UNSPECIFIED: ICD-10-CM

## 2024-07-24 DIAGNOSIS — F41.1 GENERALIZED ANXIETY DISORDER: ICD-10-CM

## 2024-07-24 PROCEDURE — G2211 COMPLEX E/M VISIT ADD ON: CPT

## 2024-07-24 PROCEDURE — 99214 OFFICE O/P EST MOD 30 MIN: CPT

## 2024-07-24 NOTE — HISTORY OF PRESENT ILLNESS
[de-identified] : Here today for follow up. History of fibromyalgia- followed by rheum. on Cymbalta and lyrica.  History of hyperlipidemia- stable on meds History of IVAN- stable on meds. Only using sparingly

## 2024-07-24 NOTE — ASSESSMENT
[FreeTextEntry1] : Generalized anxiety patient taking alprazolam 1 mg at bedtime.  Patient knows to use sparingly. Fibromyalgia follow-up with rheumatologist continue duloxetine 60 mg and pregabalin 75 oral capsule twice daily Hyperlipidemia stable on current medication continue atorvastatin 10 mg daily patient to have blood work done by rheumatology Migraine headaches patient followed by neurology continue Qulipta 60 mg 1 tablet daily and Ubrelvy  100 mg as needed headache

## 2024-07-30 ENCOUNTER — APPOINTMENT (OUTPATIENT)
Dept: INTERNAL MEDICINE | Facility: CLINIC | Age: 64
End: 2024-07-30
Payer: COMMERCIAL

## 2024-07-30 DIAGNOSIS — J06.9 ACUTE UPPER RESPIRATORY INFECTION, UNSPECIFIED: ICD-10-CM

## 2024-07-30 PROCEDURE — 99212 OFFICE O/P EST SF 10 MIN: CPT

## 2024-07-30 NOTE — HISTORY OF PRESENT ILLNESS
[FreeTextEntry8] : Pt feels cold like symptoms   tested positive for covid mild symptoms No temps. Symptoms started yesterday

## 2024-07-30 NOTE — ASSESSMENT
[FreeTextEntry1] : Viral URI- supportive care. Pt to test for COVID- will start meds if test positive.  Pt to go to urgent care to test now. Time spent on phone and charting. 12 minutes.

## 2024-09-17 ENCOUNTER — NON-APPOINTMENT (OUTPATIENT)
Age: 64
End: 2024-09-17

## 2024-10-25 ENCOUNTER — APPOINTMENT (OUTPATIENT)
Dept: INTERNAL MEDICINE | Facility: CLINIC | Age: 64
End: 2024-10-25
Payer: COMMERCIAL

## 2024-10-25 ENCOUNTER — RX RENEWAL (OUTPATIENT)
Age: 64
End: 2024-10-25

## 2024-10-25 VITALS
HEART RATE: 90 BPM | OXYGEN SATURATION: 98 % | DIASTOLIC BLOOD PRESSURE: 80 MMHG | BODY MASS INDEX: 32.02 KG/M2 | TEMPERATURE: 98.8 F | SYSTOLIC BLOOD PRESSURE: 120 MMHG | HEIGHT: 62 IN | WEIGHT: 174 LBS | RESPIRATION RATE: 16 BRPM

## 2024-10-25 DIAGNOSIS — M79.7 FIBROMYALGIA: ICD-10-CM

## 2024-10-25 DIAGNOSIS — E03.9 HYPOTHYROIDISM, UNSPECIFIED: ICD-10-CM

## 2024-10-25 DIAGNOSIS — I10 ESSENTIAL (PRIMARY) HYPERTENSION: ICD-10-CM

## 2024-10-25 PROCEDURE — 99214 OFFICE O/P EST MOD 30 MIN: CPT

## 2024-10-25 PROCEDURE — G2211 COMPLEX E/M VISIT ADD ON: CPT

## 2024-11-14 DIAGNOSIS — M79.7 FIBROMYALGIA: ICD-10-CM

## 2024-12-20 ENCOUNTER — NON-APPOINTMENT (OUTPATIENT)
Age: 64
End: 2024-12-20

## 2024-12-20 ENCOUNTER — APPOINTMENT (OUTPATIENT)
Dept: CARDIOLOGY | Facility: CLINIC | Age: 64
End: 2024-12-20
Payer: COMMERCIAL

## 2024-12-20 VITALS
WEIGHT: 172 LBS | HEART RATE: 72 BPM | OXYGEN SATURATION: 97 % | SYSTOLIC BLOOD PRESSURE: 133 MMHG | DIASTOLIC BLOOD PRESSURE: 89 MMHG | HEIGHT: 62 IN | BODY MASS INDEX: 31.65 KG/M2

## 2024-12-20 VITALS — DIASTOLIC BLOOD PRESSURE: 80 MMHG | SYSTOLIC BLOOD PRESSURE: 135 MMHG

## 2024-12-20 DIAGNOSIS — I10 ESSENTIAL (PRIMARY) HYPERTENSION: ICD-10-CM

## 2024-12-20 DIAGNOSIS — R07.89 OTHER CHEST PAIN: ICD-10-CM

## 2024-12-20 PROCEDURE — 99214 OFFICE O/P EST MOD 30 MIN: CPT | Mod: 25

## 2024-12-20 PROCEDURE — 93000 ELECTROCARDIOGRAM COMPLETE: CPT

## 2024-12-30 ENCOUNTER — APPOINTMENT (OUTPATIENT)
Dept: RADIOLOGY | Facility: CLINIC | Age: 64
End: 2024-12-30
Payer: COMMERCIAL

## 2024-12-30 PROCEDURE — 77080 DXA BONE DENSITY AXIAL: CPT

## 2025-02-11 ENCOUNTER — APPOINTMENT (OUTPATIENT)
Dept: INTERNAL MEDICINE | Facility: CLINIC | Age: 65
End: 2025-02-11

## 2025-02-21 ENCOUNTER — APPOINTMENT (OUTPATIENT)
Dept: CT IMAGING | Facility: CLINIC | Age: 65
End: 2025-02-21

## 2025-02-28 ENCOUNTER — APPOINTMENT (OUTPATIENT)
Dept: INTERNAL MEDICINE | Facility: CLINIC | Age: 65
End: 2025-02-28
Payer: COMMERCIAL

## 2025-02-28 VITALS
BODY MASS INDEX: 31.1 KG/M2 | OXYGEN SATURATION: 95 % | RESPIRATION RATE: 16 BRPM | HEART RATE: 74 BPM | SYSTOLIC BLOOD PRESSURE: 120 MMHG | WEIGHT: 169 LBS | TEMPERATURE: 98.2 F | DIASTOLIC BLOOD PRESSURE: 82 MMHG | HEIGHT: 62 IN

## 2025-02-28 DIAGNOSIS — Z00.00 ENCOUNTER FOR GENERAL ADULT MEDICAL EXAMINATION W/OUT ABNORMAL FINDINGS: ICD-10-CM

## 2025-02-28 DIAGNOSIS — I10 ESSENTIAL (PRIMARY) HYPERTENSION: ICD-10-CM

## 2025-02-28 DIAGNOSIS — E03.9 HYPOTHYROIDISM, UNSPECIFIED: ICD-10-CM

## 2025-02-28 PROCEDURE — 99396 PREV VISIT EST AGE 40-64: CPT

## 2025-03-04 LAB
25(OH)D3 SERPL-MCNC: 38.3 NG/ML
ALBUMIN SERPL ELPH-MCNC: 4.6 G/DL
ALP BLD-CCNC: 86 U/L
ALT SERPL-CCNC: 12 U/L
ANION GAP SERPL CALC-SCNC: 15 MMOL/L
APPEARANCE: CLEAR
AST SERPL-CCNC: 14 U/L
BACTERIA: NEGATIVE /HPF
BASOPHILS # BLD AUTO: 0.07 K/UL
BASOPHILS NFR BLD AUTO: 1.4 %
BILIRUB SERPL-MCNC: 1 MG/DL
BILIRUBIN URINE: NEGATIVE
BLOOD URINE: NEGATIVE
BUN SERPL-MCNC: 10 MG/DL
CALCIUM SERPL-MCNC: 9.7 MG/DL
CAST: 0 /LPF
CHLORIDE SERPL-SCNC: 104 MMOL/L
CHOLEST SERPL-MCNC: 200 MG/DL
CO2 SERPL-SCNC: 25 MMOL/L
COLOR: YELLOW
CREAT SERPL-MCNC: 0.48 MG/DL
EGFR: 106 ML/MIN/1.73M2
EOSINOPHIL # BLD AUTO: 0.06 K/UL
EOSINOPHIL NFR BLD AUTO: 1.2 %
EPITHELIAL CELLS: 3 /HPF
ESTIMATED AVERAGE GLUCOSE: 128 MG/DL
FOLATE SERPL-MCNC: 15 NG/ML
GLUCOSE QUALITATIVE U: NEGATIVE MG/DL
GLUCOSE SERPL-MCNC: 118 MG/DL
HBA1C MFR BLD HPLC: 6.1 %
HCT VFR BLD CALC: 46.2 %
HDLC SERPL-MCNC: 57 MG/DL
HGB BLD-MCNC: 14.9 G/DL
IMM GRANULOCYTES NFR BLD AUTO: 0.4 %
KETONES URINE: NEGATIVE MG/DL
LDLC SERPL CALC-MCNC: 117 MG/DL
LEUKOCYTE ESTERASE URINE: ABNORMAL
LYMPHOCYTES # BLD AUTO: 1.86 K/UL
LYMPHOCYTES NFR BLD AUTO: 37 %
MAN DIFF?: NORMAL
MCHC RBC-ENTMCNC: 28.5 PG
MCHC RBC-ENTMCNC: 32.3 G/DL
MCV RBC AUTO: 88.5 FL
MICROSCOPIC-UA: NORMAL
MONOCYTES # BLD AUTO: 0.32 K/UL
MONOCYTES NFR BLD AUTO: 6.4 %
NEUTROPHILS # BLD AUTO: 2.7 K/UL
NEUTROPHILS NFR BLD AUTO: 53.6 %
NITRITE URINE: NEGATIVE
NONHDLC SERPL-MCNC: 143 MG/DL
PH URINE: 7.5
PLATELET # BLD AUTO: 252 K/UL
POTASSIUM SERPL-SCNC: 4 MMOL/L
PROT SERPL-MCNC: 6.9 G/DL
PROTEIN URINE: NEGATIVE MG/DL
RBC # BLD: 5.22 M/UL
RBC # FLD: 12.3 %
RED BLOOD CELLS URINE: 2 /HPF
SODIUM SERPL-SCNC: 144 MMOL/L
SPECIFIC GRAVITY URINE: 1.01
TRIGL SERPL-MCNC: 150 MG/DL
UROBILINOGEN URINE: 1 MG/DL
VIT B12 SERPL-MCNC: 993 PG/ML
WBC # FLD AUTO: 5.03 K/UL
WHITE BLOOD CELLS URINE: 2 /HPF

## 2025-04-14 ENCOUNTER — RX RENEWAL (OUTPATIENT)
Age: 65
End: 2025-04-14

## 2025-05-23 ENCOUNTER — NON-APPOINTMENT (OUTPATIENT)
Age: 65
End: 2025-05-23

## 2025-05-28 ENCOUNTER — APPOINTMENT (OUTPATIENT)
Dept: CARDIOLOGY | Facility: CLINIC | Age: 65
End: 2025-05-28
Payer: COMMERCIAL

## 2025-05-28 PROCEDURE — 78452 HT MUSCLE IMAGE SPECT MULT: CPT

## 2025-05-28 PROCEDURE — A9500: CPT

## 2025-05-28 PROCEDURE — 93015 CV STRESS TEST SUPVJ I&R: CPT

## 2025-06-12 ENCOUNTER — APPOINTMENT (OUTPATIENT)
Dept: INTERNAL MEDICINE | Facility: CLINIC | Age: 65
End: 2025-06-12
Payer: COMMERCIAL

## 2025-06-12 VITALS
DIASTOLIC BLOOD PRESSURE: 72 MMHG | RESPIRATION RATE: 12 BRPM | BODY MASS INDEX: 31.28 KG/M2 | HEART RATE: 85 BPM | SYSTOLIC BLOOD PRESSURE: 120 MMHG | OXYGEN SATURATION: 99 % | WEIGHT: 170 LBS | HEIGHT: 62 IN

## 2025-06-12 PROCEDURE — 99214 OFFICE O/P EST MOD 30 MIN: CPT

## 2025-06-12 PROCEDURE — G2211 COMPLEX E/M VISIT ADD ON: CPT

## 2025-06-21 ENCOUNTER — APPOINTMENT (OUTPATIENT)
Dept: ORTHOPEDIC SURGERY | Facility: CLINIC | Age: 65
End: 2025-06-21

## 2025-06-21 VITALS — WEIGHT: 170 LBS | BODY MASS INDEX: 31.28 KG/M2 | HEIGHT: 62 IN

## 2025-06-21 PROBLEM — M17.11 PRIMARY OSTEOARTHRITIS OF RIGHT KNEE: Status: ACTIVE | Noted: 2025-06-21

## 2025-06-21 PROBLEM — M17.12 PRIMARY OSTEOARTHRITIS OF LEFT KNEE: Status: ACTIVE | Noted: 2025-06-21

## 2025-06-21 PROBLEM — M25.561 KNEE PAIN, BILATERAL: Status: RESOLVED | Noted: 2025-06-21 | Resolved: 2025-06-21

## 2025-06-21 PROCEDURE — 73562 X-RAY EXAM OF KNEE 3: CPT | Mod: 50

## 2025-06-21 PROCEDURE — 20610 DRAIN/INJ JOINT/BURSA W/O US: CPT | Mod: 50

## 2025-06-21 PROCEDURE — 99214 OFFICE O/P EST MOD 30 MIN: CPT | Mod: 25

## 2025-06-21 RX ADMIN — LIDOCAINE HYDROCHLORIDE 3 %: 10 INJECTION, SOLUTION INFILTRATION; PERINEURAL at 00:00

## 2025-06-21 RX ADMIN — METHYLPREDNISOLONE ACETATE 2 MG/ML: 40 INJECTION, SUSPENSION INTRA-ARTICULAR; INTRALESIONAL; INTRAMUSCULAR; SOFT TISSUE at 00:00

## 2025-06-23 RX ORDER — METHYLPRED ACET/NACL,ISO-OS/PF 40 MG/ML
40 VIAL (ML) INJECTION
Refills: 0 | Status: COMPLETED | OUTPATIENT
Start: 2025-06-21

## 2025-06-23 RX ORDER — LIDOCAINE HCL/PF 10 MG/ML
1 VIAL (ML) INJECTION
Refills: 0 | Status: COMPLETED | OUTPATIENT
Start: 2025-06-21

## 2025-07-03 ENCOUNTER — RX RENEWAL (OUTPATIENT)
Age: 65
End: 2025-07-03

## 2025-07-05 ENCOUNTER — EMERGENCY (EMERGENCY)
Facility: HOSPITAL | Age: 65
LOS: 1 days | End: 2025-07-05
Attending: STUDENT IN AN ORGANIZED HEALTH CARE EDUCATION/TRAINING PROGRAM | Admitting: STUDENT IN AN ORGANIZED HEALTH CARE EDUCATION/TRAINING PROGRAM
Payer: COMMERCIAL

## 2025-07-05 VITALS
RESPIRATION RATE: 19 BRPM | DIASTOLIC BLOOD PRESSURE: 84 MMHG | HEART RATE: 70 BPM | SYSTOLIC BLOOD PRESSURE: 128 MMHG | OXYGEN SATURATION: 99 % | TEMPERATURE: 98 F

## 2025-07-05 VITALS
RESPIRATION RATE: 19 BRPM | OXYGEN SATURATION: 99 % | WEIGHT: 169.98 LBS | TEMPERATURE: 98 F | HEART RATE: 73 BPM | SYSTOLIC BLOOD PRESSURE: 136 MMHG | HEIGHT: 62 IN | DIASTOLIC BLOOD PRESSURE: 85 MMHG

## 2025-07-05 DIAGNOSIS — Z90.49 ACQUIRED ABSENCE OF OTHER SPECIFIED PARTS OF DIGESTIVE TRACT: Chronic | ICD-10-CM

## 2025-07-05 LAB
ALBUMIN SERPL ELPH-MCNC: 3.7 G/DL — SIGNIFICANT CHANGE UP (ref 3.3–5)
ALP SERPL-CCNC: 77 U/L — SIGNIFICANT CHANGE UP (ref 40–120)
ALT FLD-CCNC: 22 U/L — SIGNIFICANT CHANGE UP (ref 12–78)
ANION GAP SERPL CALC-SCNC: 7 MMOL/L — SIGNIFICANT CHANGE UP (ref 5–17)
APPEARANCE UR: ABNORMAL
AST SERPL-CCNC: 6 U/L — LOW (ref 15–37)
BACTERIA # UR AUTO: ABNORMAL /HPF
BASOPHILS # BLD AUTO: 0.07 K/UL — SIGNIFICANT CHANGE UP (ref 0–0.2)
BASOPHILS NFR BLD AUTO: 0.5 % — SIGNIFICANT CHANGE UP (ref 0–2)
BILIRUB SERPL-MCNC: 1 MG/DL — SIGNIFICANT CHANGE UP (ref 0.2–1.2)
BILIRUB UR-MCNC: NEGATIVE — SIGNIFICANT CHANGE UP
BUN SERPL-MCNC: 14 MG/DL — SIGNIFICANT CHANGE UP (ref 7–23)
CALCIUM SERPL-MCNC: 9.2 MG/DL — SIGNIFICANT CHANGE UP (ref 8.5–10.1)
CHLORIDE SERPL-SCNC: 105 MMOL/L — SIGNIFICANT CHANGE UP (ref 96–108)
CO2 SERPL-SCNC: 28 MMOL/L — SIGNIFICANT CHANGE UP (ref 22–31)
COLOR SPEC: YELLOW — SIGNIFICANT CHANGE UP
CREAT SERPL-MCNC: 0.72 MG/DL — SIGNIFICANT CHANGE UP (ref 0.5–1.3)
DIFF PNL FLD: ABNORMAL
EGFR: 93 ML/MIN/1.73M2 — SIGNIFICANT CHANGE UP
EGFR: 93 ML/MIN/1.73M2 — SIGNIFICANT CHANGE UP
EOSINOPHIL # BLD AUTO: 0.04 K/UL — SIGNIFICANT CHANGE UP (ref 0–0.5)
EOSINOPHIL NFR BLD AUTO: 0.3 % — SIGNIFICANT CHANGE UP (ref 0–6)
EPI CELLS # UR: PRESENT
GLUCOSE SERPL-MCNC: 107 MG/DL — HIGH (ref 70–99)
GLUCOSE UR QL: NEGATIVE MG/DL — SIGNIFICANT CHANGE UP
HCT VFR BLD CALC: 44.4 % — SIGNIFICANT CHANGE UP (ref 34.5–45)
HGB BLD-MCNC: 15 G/DL — SIGNIFICANT CHANGE UP (ref 11.5–15.5)
IMM GRANULOCYTES # BLD AUTO: 0.06 K/UL — SIGNIFICANT CHANGE UP (ref 0–0.07)
IMM GRANULOCYTES NFR BLD AUTO: 0.4 % — SIGNIFICANT CHANGE UP (ref 0–0.9)
KETONES UR QL: ABNORMAL MG/DL
LEUKOCYTE ESTERASE UR-ACNC: ABNORMAL
LIDOCAIN IGE QN: 56 U/L — SIGNIFICANT CHANGE UP (ref 13–75)
LYMPHOCYTES # BLD AUTO: 2.97 K/UL — SIGNIFICANT CHANGE UP (ref 1–3.3)
LYMPHOCYTES NFR BLD AUTO: 21.1 % — SIGNIFICANT CHANGE UP (ref 13–44)
MCHC RBC-ENTMCNC: 29.4 PG — SIGNIFICANT CHANGE UP (ref 27–34)
MCHC RBC-ENTMCNC: 33.8 G/DL — SIGNIFICANT CHANGE UP (ref 32–36)
MCV RBC AUTO: 86.9 FL — SIGNIFICANT CHANGE UP (ref 80–100)
MONOCYTES # BLD AUTO: 0.84 K/UL — SIGNIFICANT CHANGE UP (ref 0–0.9)
MONOCYTES NFR BLD AUTO: 6 % — SIGNIFICANT CHANGE UP (ref 2–14)
NEUTROPHILS # BLD AUTO: 10.09 K/UL — HIGH (ref 1.8–7.4)
NEUTROPHILS NFR BLD AUTO: 71.7 % — SIGNIFICANT CHANGE UP (ref 43–77)
NITRITE UR-MCNC: POSITIVE
NRBC # BLD AUTO: 0 K/UL — SIGNIFICANT CHANGE UP (ref 0–0)
NRBC # FLD: 0 K/UL — SIGNIFICANT CHANGE UP (ref 0–0)
NRBC BLD AUTO-RTO: 0 /100 WBCS — SIGNIFICANT CHANGE UP (ref 0–0)
PH UR: 6 — SIGNIFICANT CHANGE UP (ref 5–8)
PLATELET # BLD AUTO: 243 K/UL — SIGNIFICANT CHANGE UP (ref 150–400)
PMV BLD: 10.2 FL — SIGNIFICANT CHANGE UP (ref 7–13)
POTASSIUM SERPL-MCNC: 3.2 MMOL/L — LOW (ref 3.5–5.3)
POTASSIUM SERPL-SCNC: 3.2 MMOL/L — LOW (ref 3.5–5.3)
PROT SERPL-MCNC: 7 G/DL — SIGNIFICANT CHANGE UP (ref 6–8.3)
PROT UR-MCNC: 100 MG/DL
RBC # BLD: 5.11 M/UL — SIGNIFICANT CHANGE UP (ref 3.8–5.2)
RBC # FLD: 12 % — SIGNIFICANT CHANGE UP (ref 10.3–14.5)
RBC CASTS # UR COMP ASSIST: 6 /HPF — HIGH (ref 0–4)
SODIUM SERPL-SCNC: 140 MMOL/L — SIGNIFICANT CHANGE UP (ref 135–145)
SP GR SPEC: 1.02 — SIGNIFICANT CHANGE UP (ref 1–1.03)
UROBILINOGEN FLD QL: 1 MG/DL — SIGNIFICANT CHANGE UP (ref 0.2–1)
WBC # BLD: 14.07 K/UL — HIGH (ref 3.8–10.5)
WBC # FLD AUTO: 14.07 K/UL — HIGH (ref 3.8–10.5)
WBC UR QL: ABNORMAL /HPF (ref 0–5)

## 2025-07-05 PROCEDURE — 83690 ASSAY OF LIPASE: CPT

## 2025-07-05 PROCEDURE — 85025 COMPLETE CBC W/AUTO DIFF WBC: CPT

## 2025-07-05 PROCEDURE — 96374 THER/PROPH/DIAG INJ IV PUSH: CPT | Mod: XU

## 2025-07-05 PROCEDURE — 99284 EMERGENCY DEPT VISIT MOD MDM: CPT | Mod: 25

## 2025-07-05 PROCEDURE — 96375 TX/PRO/DX INJ NEW DRUG ADDON: CPT

## 2025-07-05 PROCEDURE — 99285 EMERGENCY DEPT VISIT HI MDM: CPT

## 2025-07-05 PROCEDURE — 87186 SC STD MICRODIL/AGAR DIL: CPT

## 2025-07-05 PROCEDURE — 74177 CT ABD & PELVIS W/CONTRAST: CPT

## 2025-07-05 PROCEDURE — 80053 COMPREHEN METABOLIC PANEL: CPT

## 2025-07-05 PROCEDURE — 81001 URINALYSIS AUTO W/SCOPE: CPT

## 2025-07-05 PROCEDURE — 36415 COLL VENOUS BLD VENIPUNCTURE: CPT

## 2025-07-05 PROCEDURE — 74177 CT ABD & PELVIS W/CONTRAST: CPT | Mod: 26

## 2025-07-05 PROCEDURE — 87086 URINE CULTURE/COLONY COUNT: CPT

## 2025-07-05 RX ORDER — CEFTRIAXONE 500 MG/1
1000 INJECTION, POWDER, FOR SOLUTION INTRAMUSCULAR; INTRAVENOUS ONCE
Refills: 0 | Status: COMPLETED | OUTPATIENT
Start: 2025-07-05 | End: 2025-07-05

## 2025-07-05 RX ORDER — ONDANSETRON HCL/PF 4 MG/2 ML
4 VIAL (ML) INJECTION ONCE
Refills: 0 | Status: COMPLETED | OUTPATIENT
Start: 2025-07-05 | End: 2025-07-05

## 2025-07-05 RX ORDER — CEFPODOXIME PROXETIL 200 MG/1
1 TABLET, FILM COATED ORAL
Qty: 20 | Refills: 0
Start: 2025-07-05 | End: 2025-07-14

## 2025-07-05 RX ADMIN — Medication 4 MILLIGRAM(S): at 17:05

## 2025-07-05 RX ADMIN — Medication 1000 MILLILITER(S): at 17:06

## 2025-07-05 RX ADMIN — Medication 4 MILLIGRAM(S): at 18:05

## 2025-07-05 RX ADMIN — CEFTRIAXONE 100 MILLIGRAM(S): 500 INJECTION, POWDER, FOR SOLUTION INTRAMUSCULAR; INTRAVENOUS at 18:14

## 2025-07-05 RX ADMIN — Medication 40 MILLIEQUIVALENT(S): at 19:55

## 2025-07-05 NOTE — ED PROVIDER NOTE - OBJECTIVE STATEMENT
65-year-old female history of fibromyalgia, migraines here complaining of lower abdominal pain for the past few days, worse since yesterday.  Associated nausea, no vomiting.  No reported fevers or chills.  Patient reports baseline constipation.  Patient also notes dysuria.  Patient taking Tylenol at home without significant relief.  Patient called her on-call doctor and was told to come to the ER for CT scan.

## 2025-07-05 NOTE — ED PROVIDER NOTE - PATIENT PORTAL LINK FT
You can access the FollowMyHealth Patient Portal offered by [facility] by registering at the following website: [email or web address]. By joining [employer]’s FollowMyHealth portal, you will also be able to view your health information using other applications (apps) compatible with our system.

## 2025-07-05 NOTE — ED PROVIDER NOTE - CLINICAL SUMMARY MEDICAL DECISION MAKING FREE TEXT BOX
Here complaining of lower abdominal pain with constipation and dysuria. check labs, CT, UA, treat symptoms as needed, re-eval.

## 2025-07-05 NOTE — ED PROVIDER NOTE - CARE PROVIDER_API CALL
Marilou Atikns  Internal Medicine  [address] [place] [id number]  Phone: [phone]  Fax: [phone]  Established Patient  Follow Up Time: 1-3 Days

## 2025-07-05 NOTE — ED ADULT TRIAGE NOTE - CHIEF COMPLAINT QUOTE
pt ambulated into the ED C/O abdominal pain x4 days. states last night pain has increased, denies nausea, vomiting, and diarrhea.

## 2025-07-05 NOTE — ED PROVIDER NOTE - PHYSICAL EXAMINATION
Vital signs as available reviewed.  General:  No acute distress.  Head:  Normocephalic, atraumatic.  Eyes:  Conjunctiva pink, no icterus.  Cardiovascular:  Regular rate, no obvious murmur.  Respiratory:  Clear to auscultation, good air entry bilaterally.  Abdomen:  Soft, + bilateral lower and suprapubic tenderness to palpation.  Musculoskeletal:  No obvious deformity.  Neurologic: Alert and oriented, moving all extremities.  Skin:  Warm and dry.

## 2025-07-05 NOTE — ED ADULT NURSE NOTE - OBJECTIVE STATEMENT
A&Ox4 to ED with complaints of lower abdominal pain x 4 days. has some pressure and burning with urination. now pain radiating to her back and causing difficulty walking. +nausea. denies vomiting/diarrhea. abdomen soft nontender. denies fevers/chills. took tylenol at 1330

## 2025-07-05 NOTE — ED PROVIDER NOTE - PROGRESS NOTE DETAILS
Results discussed with patient and family member at the side.  Will discharge with antibiotics and outpatient follow-up with PMD.  Patient and family member agreeable to plan.

## 2025-07-07 LAB
-  AMOXICILLIN/CLAVULANIC ACID: SIGNIFICANT CHANGE UP
-  AMPICILLIN/SULBACTAM: SIGNIFICANT CHANGE UP
-  AMPICILLIN: SIGNIFICANT CHANGE UP
-  AZTREONAM: SIGNIFICANT CHANGE UP
-  CEFAZOLIN: SIGNIFICANT CHANGE UP
-  CEFEPIME: SIGNIFICANT CHANGE UP
-  CEFOXITIN: SIGNIFICANT CHANGE UP
-  CEFTRIAXONE: SIGNIFICANT CHANGE UP
-  CEFUROXIME: SIGNIFICANT CHANGE UP
-  CIPROFLOXACIN: SIGNIFICANT CHANGE UP
-  ERTAPENEM: SIGNIFICANT CHANGE UP
-  GENTAMICIN: SIGNIFICANT CHANGE UP
-  IMIPENEM: SIGNIFICANT CHANGE UP
-  LEVOFLOXACIN: SIGNIFICANT CHANGE UP
-  MEROPENEM: SIGNIFICANT CHANGE UP
-  NITROFURANTOIN: SIGNIFICANT CHANGE UP
-  PIPERACILLIN/TAZOBACTAM: SIGNIFICANT CHANGE UP
-  TIGECYCLINE: SIGNIFICANT CHANGE UP
-  TOBRAMYCIN: SIGNIFICANT CHANGE UP
-  TRIMETHOPRIM/SULFAMETHOXAZOLE: SIGNIFICANT CHANGE UP
CULTURE RESULTS: ABNORMAL
METHOD TYPE: SIGNIFICANT CHANGE UP
ORGANISM # SPEC MICROSCOPIC CNT: ABNORMAL
ORGANISM # SPEC MICROSCOPIC CNT: SIGNIFICANT CHANGE UP
SPECIMEN SOURCE: SIGNIFICANT CHANGE UP

## 2025-07-11 ENCOUNTER — APPOINTMENT (OUTPATIENT)
Dept: INTERNAL MEDICINE | Facility: CLINIC | Age: 65
End: 2025-07-11
Payer: COMMERCIAL

## 2025-07-11 ENCOUNTER — LABORATORY RESULT (OUTPATIENT)
Age: 65
End: 2025-07-11

## 2025-07-11 VITALS
SYSTOLIC BLOOD PRESSURE: 118 MMHG | HEIGHT: 62 IN | DIASTOLIC BLOOD PRESSURE: 74 MMHG | WEIGHT: 170 LBS | HEART RATE: 82 BPM | RESPIRATION RATE: 16 BRPM | TEMPERATURE: 97.7 F | OXYGEN SATURATION: 98 % | BODY MASS INDEX: 31.28 KG/M2

## 2025-07-11 PROCEDURE — G2211 COMPLEX E/M VISIT ADD ON: CPT

## 2025-07-11 PROCEDURE — 99214 OFFICE O/P EST MOD 30 MIN: CPT

## 2025-07-11 RX ORDER — CIPROFLOXACIN HYDROCHLORIDE 500 MG/1
500 TABLET, FILM COATED ORAL TWICE DAILY
Qty: 14 | Refills: 0 | Status: ACTIVE | COMMUNITY
Start: 2025-07-11 | End: 1900-01-01

## 2025-07-14 LAB
ALBUMIN SERPL ELPH-MCNC: 4.8 G/DL
ALP BLD-CCNC: 89 U/L
ALT SERPL-CCNC: 25 U/L
ANION GAP SERPL CALC-SCNC: 15 MMOL/L
APPEARANCE: CLEAR
AST SERPL-CCNC: 21 U/L
BACTERIA UR CULT: NORMAL
BASOPHILS # BLD AUTO: 0.06 K/UL
BASOPHILS NFR BLD AUTO: 0.8 %
BILIRUB SERPL-MCNC: 1 MG/DL
BILIRUBIN URINE: ABNORMAL
BLOOD URINE: NEGATIVE
BUN SERPL-MCNC: 9 MG/DL
CALCIUM SERPL-MCNC: 10.5 MG/DL
CHLORIDE SERPL-SCNC: 101 MMOL/L
CO2 SERPL-SCNC: 26 MMOL/L
COLOR: NORMAL
CREAT SERPL-MCNC: 0.52 MG/DL
EGFRCR SERPLBLD CKD-EPI 2021: 104 ML/MIN/1.73M2
EOSINOPHIL # BLD AUTO: 0.07 K/UL
EOSINOPHIL NFR BLD AUTO: 1 %
GLUCOSE QUALITATIVE U: NEGATIVE MG/DL
GLUCOSE SERPL-MCNC: 91 MG/DL
HCT VFR BLD CALC: 48.7 %
HGB BLD-MCNC: 15.8 G/DL
IMM GRANULOCYTES NFR BLD AUTO: 0.3 %
KETONES URINE: NEGATIVE MG/DL
LEUKOCYTE ESTERASE URINE: ABNORMAL
LYMPHOCYTES # BLD AUTO: 2.54 K/UL
LYMPHOCYTES NFR BLD AUTO: 35.3 %
MAN DIFF?: NORMAL
MCHC RBC-ENTMCNC: 28.9 PG
MCHC RBC-ENTMCNC: 32.4 G/DL
MCV RBC AUTO: 89 FL
MONOCYTES # BLD AUTO: 0.59 K/UL
MONOCYTES NFR BLD AUTO: 8.2 %
NEUTROPHILS # BLD AUTO: 3.91 K/UL
NEUTROPHILS NFR BLD AUTO: 54.4 %
NITRITE URINE: POSITIVE
PH URINE: 7
PLATELET # BLD AUTO: 290 K/UL
POTASSIUM SERPL-SCNC: 4.1 MMOL/L
PROT SERPL-MCNC: 7.2 G/DL
PROTEIN URINE: NEGATIVE MG/DL
RBC # BLD: 5.47 M/UL
RBC # FLD: 12.9 %
SODIUM SERPL-SCNC: 142 MMOL/L
SPECIFIC GRAVITY URINE: 1.01
UROBILINOGEN URINE: 1 MG/DL
WBC # FLD AUTO: 7.19 K/UL

## 2025-07-16 ENCOUNTER — APPOINTMENT (OUTPATIENT)
Dept: UROLOGY | Facility: CLINIC | Age: 65
End: 2025-07-16
Payer: COMMERCIAL

## 2025-07-16 PROBLEM — N12 PYELONEPHRITIS: Status: ACTIVE | Noted: 2025-07-16

## 2025-07-16 PROBLEM — Z87.448 HISTORY OF PYELONEPHRITIS: Status: RESOLVED | Noted: 2025-07-11 | Resolved: 2025-07-16

## 2025-07-16 PROBLEM — N39.0 RECURRENT UTI: Status: ACTIVE | Noted: 2025-07-16

## 2025-07-16 PROCEDURE — 99204 OFFICE O/P NEW MOD 45 MIN: CPT

## 2025-07-16 RX ORDER — ESTRADIOL 0.1 MG/G
0.1 CREAM VAGINAL
Qty: 1 | Refills: 5 | Status: ACTIVE | COMMUNITY
Start: 2025-07-16 | End: 1900-01-01

## 2025-07-17 LAB
APPEARANCE: CLEAR
BACTERIA: NEGATIVE /HPF
BILIRUBIN URINE: NEGATIVE
BLOOD URINE: NEGATIVE
CALCIUM OXALATE CRYSTALS: PRESENT
CAST: 1 /LPF
COLOR: YELLOW
EPITHELIAL CELLS: 2 /HPF
GLUCOSE QUALITATIVE U: NEGATIVE MG/DL
KETONES URINE: NEGATIVE MG/DL
LEUKOCYTE ESTERASE URINE: ABNORMAL
MICROSCOPIC-UA: NORMAL
NITRITE URINE: NEGATIVE
PH URINE: 5.5
PROTEIN URINE: NEGATIVE MG/DL
RED BLOOD CELLS URINE: 0 /HPF
REVIEW: NORMAL
SPECIFIC GRAVITY URINE: 1.02
UROBILINOGEN URINE: 0.2 MG/DL
WHITE BLOOD CELLS URINE: 1 /HPF

## 2025-07-18 LAB — BACTERIA UR CULT: NORMAL

## 2025-07-21 ENCOUNTER — RESULT REVIEW (OUTPATIENT)
Age: 65
End: 2025-07-21

## 2025-07-28 LAB
APPEARANCE: CLEAR
BACTERIA UR CULT: NORMAL
BACTERIA: NEGATIVE /HPF
BILIRUBIN URINE: NEGATIVE
BLOOD URINE: NEGATIVE
CAST: 0 /LPF
COLOR: NORMAL
EPITHELIAL CELLS: 2 /HPF
GLUCOSE QUALITATIVE U: NEGATIVE MG/DL
KETONES URINE: NEGATIVE MG/DL
LEUKOCYTE ESTERASE URINE: ABNORMAL
MICROSCOPIC-UA: NORMAL
NITRITE URINE: NEGATIVE
PH URINE: 8
PROTEIN URINE: NEGATIVE MG/DL
RED BLOOD CELLS URINE: 1 /HPF
SPECIFIC GRAVITY URINE: 1.01
UROBILINOGEN URINE: 0.2 MG/DL
WHITE BLOOD CELLS URINE: 1 /HPF

## 2025-08-11 ENCOUNTER — APPOINTMENT (OUTPATIENT)
Dept: NEUROLOGY | Facility: CLINIC | Age: 65
End: 2025-08-11
Payer: COMMERCIAL

## 2025-08-11 ENCOUNTER — APPOINTMENT (OUTPATIENT)
Dept: CT IMAGING | Facility: CLINIC | Age: 65
End: 2025-08-11
Payer: COMMERCIAL

## 2025-08-11 DIAGNOSIS — Z00.00 ENCOUNTER FOR GENERAL ADULT MEDICAL EXAMINATION W/OUT ABNORMAL FINDINGS: ICD-10-CM

## 2025-08-11 PROCEDURE — 74178 CT ABD&PLV WO CNTR FLWD CNTR: CPT

## 2025-08-11 PROCEDURE — G2211 COMPLEX E/M VISIT ADD ON: CPT | Mod: 95

## 2025-08-11 PROCEDURE — 99214 OFFICE O/P EST MOD 30 MIN: CPT | Mod: 95

## 2025-08-11 RX ORDER — TOPIRAMATE 50 MG/1
50 TABLET, FILM COATED ORAL
Qty: 30 | Refills: 3 | Status: ACTIVE | COMMUNITY
Start: 2025-08-11 | End: 1900-01-01

## 2025-08-14 ENCOUNTER — NON-APPOINTMENT (OUTPATIENT)
Age: 65
End: 2025-08-14

## 2025-08-20 ENCOUNTER — APPOINTMENT (OUTPATIENT)
Dept: UROLOGY | Facility: CLINIC | Age: 65
End: 2025-08-20
Payer: COMMERCIAL

## 2025-08-20 VITALS
DIASTOLIC BLOOD PRESSURE: 86 MMHG | RESPIRATION RATE: 16 BRPM | SYSTOLIC BLOOD PRESSURE: 130 MMHG | OXYGEN SATURATION: 99 % | HEART RATE: 79 BPM

## 2025-08-20 DIAGNOSIS — N39.0 URINARY TRACT INFECTION, SITE NOT SPECIFIED: ICD-10-CM

## 2025-08-20 PROCEDURE — 99214 OFFICE O/P EST MOD 30 MIN: CPT

## 2025-08-27 ENCOUNTER — APPOINTMENT (OUTPATIENT)
Dept: MAMMOGRAPHY | Facility: CLINIC | Age: 65
End: 2025-08-27
Payer: COMMERCIAL

## 2025-08-27 ENCOUNTER — RESULT REVIEW (OUTPATIENT)
Age: 65
End: 2025-08-27

## 2025-08-27 ENCOUNTER — APPOINTMENT (OUTPATIENT)
Dept: ULTRASOUND IMAGING | Facility: CLINIC | Age: 65
End: 2025-08-27
Payer: COMMERCIAL

## 2025-08-27 ENCOUNTER — APPOINTMENT (OUTPATIENT)
Dept: MRI IMAGING | Facility: CLINIC | Age: 65
End: 2025-08-27
Payer: COMMERCIAL

## 2025-08-27 PROCEDURE — 77063 BREAST TOMOSYNTHESIS BI: CPT

## 2025-08-27 PROCEDURE — 73721 MRI JNT OF LWR EXTRE W/O DYE: CPT | Mod: LT,76

## 2025-08-27 PROCEDURE — 76641 ULTRASOUND BREAST COMPLETE: CPT | Mod: 50

## 2025-08-27 PROCEDURE — 77067 SCR MAMMO BI INCL CAD: CPT

## 2025-09-10 ENCOUNTER — APPOINTMENT (OUTPATIENT)
Dept: CARDIOLOGY | Facility: CLINIC | Age: 65
End: 2025-09-10
Payer: COMMERCIAL

## 2025-09-10 VITALS
SYSTOLIC BLOOD PRESSURE: 137 MMHG | DIASTOLIC BLOOD PRESSURE: 87 MMHG | BODY MASS INDEX: 31.1 KG/M2 | HEART RATE: 68 BPM | OXYGEN SATURATION: 94 % | WEIGHT: 169 LBS | HEIGHT: 62 IN

## 2025-09-10 VITALS — DIASTOLIC BLOOD PRESSURE: 78 MMHG | SYSTOLIC BLOOD PRESSURE: 122 MMHG

## 2025-09-10 DIAGNOSIS — I25.10 ATHEROSCLEROTIC HEART DISEASE OF NATIVE CORONARY ARTERY W/OUT ANGINA PECTORIS: ICD-10-CM

## 2025-09-10 DIAGNOSIS — R07.89 OTHER CHEST PAIN: ICD-10-CM

## 2025-09-10 PROCEDURE — 93000 ELECTROCARDIOGRAM COMPLETE: CPT

## 2025-09-10 PROCEDURE — 99214 OFFICE O/P EST MOD 30 MIN: CPT | Mod: 25

## 2025-09-10 RX ORDER — EVOLOCUMAB 140 MG/ML
140 INJECTION, SOLUTION SUBCUTANEOUS
Qty: 6 | Refills: 3 | Status: ACTIVE | COMMUNITY
Start: 2025-09-10

## 2025-09-16 ENCOUNTER — NON-APPOINTMENT (OUTPATIENT)
Age: 65
End: 2025-09-16

## 2025-09-19 ENCOUNTER — APPOINTMENT (OUTPATIENT)
Dept: ORTHOPEDIC SURGERY | Facility: CLINIC | Age: 65
End: 2025-09-19
Payer: COMMERCIAL

## 2025-09-19 VITALS
HEART RATE: 77 BPM | DIASTOLIC BLOOD PRESSURE: 78 MMHG | BODY MASS INDEX: 31.1 KG/M2 | HEIGHT: 62 IN | SYSTOLIC BLOOD PRESSURE: 121 MMHG | WEIGHT: 169 LBS

## 2025-09-19 DIAGNOSIS — M17.12 UNILATERAL PRIMARY OSTEOARTHRITIS, LEFT KNEE: ICD-10-CM

## 2025-09-19 DIAGNOSIS — Z87.828 PERSONAL HISTORY OF OTHER (HEALED) PHYSICAL INJURY AND TRAUMA: ICD-10-CM

## 2025-09-19 PROCEDURE — 99213 OFFICE O/P EST LOW 20 MIN: CPT

## 2025-09-19 RX ORDER — METHYLPREDNISOLONE 4 MG/1
4 TABLET ORAL
Qty: 1 | Refills: 0 | Status: ACTIVE | COMMUNITY
Start: 2025-09-19 | End: 1900-01-01

## 2025-09-19 RX ORDER — ALIROCUMAB 75 MG/ML
75 INJECTION, SOLUTION SUBCUTANEOUS
Qty: 2 | Refills: 0 | Status: ACTIVE | COMMUNITY
Start: 2025-09-19